# Patient Record
Sex: MALE | Race: WHITE | NOT HISPANIC OR LATINO | URBAN - METROPOLITAN AREA
[De-identification: names, ages, dates, MRNs, and addresses within clinical notes are randomized per-mention and may not be internally consistent; named-entity substitution may affect disease eponyms.]

---

## 2019-01-23 ENCOUNTER — APPOINTMENT (RX ONLY)
Dept: URBAN - METROPOLITAN AREA CLINIC 23 | Facility: CLINIC | Age: 49
Setting detail: DERMATOLOGY
End: 2019-01-23

## 2019-01-23 DIAGNOSIS — L82.0 INFLAMED SEBORRHEIC KERATOSIS: ICD-10-CM

## 2019-01-23 DIAGNOSIS — D22 MELANOCYTIC NEVI: ICD-10-CM

## 2019-01-23 DIAGNOSIS — L73.8 OTHER SPECIFIED FOLLICULAR DISORDERS: ICD-10-CM

## 2019-01-23 DIAGNOSIS — L82.1 OTHER SEBORRHEIC KERATOSIS: ICD-10-CM

## 2019-01-23 DIAGNOSIS — L81.4 OTHER MELANIN HYPERPIGMENTATION: ICD-10-CM

## 2019-01-23 PROBLEM — D22.5 MELANOCYTIC NEVI OF TRUNK: Status: ACTIVE | Noted: 2019-01-23

## 2019-01-23 PROCEDURE — ? COUNSELING

## 2019-01-23 PROCEDURE — 11301 SHAVE SKIN LESION 0.6-1.0 CM: CPT

## 2019-01-23 PROCEDURE — 69100 BIOPSY OF EXTERNAL EAR: CPT

## 2019-01-23 PROCEDURE — 99203 OFFICE O/P NEW LOW 30 MIN: CPT | Mod: 25

## 2019-01-23 PROCEDURE — ? SHAVE REMOVAL

## 2019-01-23 PROCEDURE — ? BIOPSY BY SHAVE METHOD

## 2019-01-23 ASSESSMENT — LOCATION DETAILED DESCRIPTION DERM
LOCATION DETAILED: RIGHT MEDIAL INFERIOR CHEST
LOCATION DETAILED: EPIGASTRIC SKIN
LOCATION DETAILED: LEFT RIB CAGE
LOCATION DETAILED: RIGHT INFERIOR UPPER BACK
LOCATION DETAILED: RIGHT SUPERIOR LATERAL UPPER BACK
LOCATION DETAILED: LEFT SUPERIOR CRUS OF ANTIHELIX
LOCATION DETAILED: LEFT MID-UPPER BACK
LOCATION DETAILED: RIGHT CLAVICULAR SKIN
LOCATION DETAILED: RIGHT MEDIAL MALAR CHEEK

## 2019-01-23 ASSESSMENT — LOCATION ZONE DERM
LOCATION ZONE: TRUNK
LOCATION ZONE: EAR
LOCATION ZONE: FACE

## 2019-01-23 ASSESSMENT — LOCATION SIMPLE DESCRIPTION DERM
LOCATION SIMPLE: CHEST
LOCATION SIMPLE: RIGHT CLAVICULAR SKIN
LOCATION SIMPLE: ABDOMEN
LOCATION SIMPLE: LEFT UPPER BACK
LOCATION SIMPLE: RIGHT UPPER BACK
LOCATION SIMPLE: LEFT EAR
LOCATION SIMPLE: RIGHT CHEEK

## 2019-01-23 NOTE — PROCEDURE: BIOPSY BY SHAVE METHOD
Destruction After The Procedure: No
Electrodesiccation Text: The wound bed was treated with electrodesiccation after the biopsy was performed.
Was A Bandage Applied: Yes
Additional Anesthesia Volume In Cc (Will Not Render If 0): 0
Accession #: [id number]
Silver Nitrate Text: The wound bed was treated with silver nitrate after the biopsy was performed.
Type Of Destruction Used: Curettage
Anesthesia Type: 1% lidocaine with epinephrine
Dressing: bandage
Notification Instructions: Patient will be notified of biopsy results. However, patient instructed to call the office if not contacted within 2 weeks.
Biopsy Type: H and E
Anesthesia Volume In Cc: 0.3
Detail Level: Detailed
Depth Of Biopsy: dermis
Consent: Written consent was obtained and risks were reviewed including but not limited to scarring, infection, bleeding, scabbing, incomplete removal, and allergy to anesthesia.
Cryotherapy Text: The wound bed was treated with cryotherapy after the biopsy was performed.
Biopsy Method: Dermablade
Wound Care: Vaseline
Electrodesiccation And Curettage Text: The wound bed was treated with electrodesiccation and curettage after the biopsy was performed.
Post-care instructions were reviewed in detail and written instructions are provided. Patient is to keep the biopsy site dry overnight, and then apply bacitracin twice daily until healed. Patient may apply hydrogen peroxide soaks to remove any crusting.
Hemostasis: Aluminum Chloride
Billing Type: Third-Party Bill

## 2019-01-23 NOTE — PROCEDURE: SHAVE REMOVAL
Post-Care Instructions: I reviewed with the patient in detail post-care instructions. Patient is to keep the biopsy site dry overnight, and then apply Vaseline and bandaid daily until healed. Patient may apply diluted hydrogen peroxide soaks to remove any crusting.

## 2019-02-27 ENCOUNTER — APPOINTMENT (RX ONLY)
Dept: URBAN - METROPOLITAN AREA CLINIC 23 | Facility: CLINIC | Age: 49
Setting detail: DERMATOLOGY
End: 2019-02-27

## 2019-02-27 DIAGNOSIS — L82.0 INFLAMED SEBORRHEIC KERATOSIS: ICD-10-CM

## 2019-02-27 DIAGNOSIS — D22 MELANOCYTIC NEVI: ICD-10-CM

## 2019-02-27 PROBLEM — D22.5 MELANOCYTIC NEVI OF TRUNK: Status: ACTIVE | Noted: 2019-02-27

## 2019-02-27 PROCEDURE — 11401 EXC TR-EXT B9+MARG 0.6-1 CM: CPT | Mod: 59

## 2019-02-27 PROCEDURE — 17110 DESTRUCTION B9 LES UP TO 14: CPT

## 2019-02-27 PROCEDURE — ? PUNCH EXCISION

## 2019-02-27 PROCEDURE — A4550 SURGICAL TRAYS: HCPCS

## 2019-02-27 PROCEDURE — 12031 INTMD RPR S/A/T/EXT 2.5 CM/<: CPT | Mod: 59

## 2019-02-27 PROCEDURE — ? LIQUID NITROGEN

## 2019-02-27 ASSESSMENT — LOCATION SIMPLE DESCRIPTION DERM
LOCATION SIMPLE: LEFT EAR
LOCATION SIMPLE: ABDOMEN

## 2019-02-27 ASSESSMENT — LOCATION DETAILED DESCRIPTION DERM
LOCATION DETAILED: LEFT SUPERIOR CRUS OF ANTIHELIX
LOCATION DETAILED: LEFT RIB CAGE

## 2019-02-27 ASSESSMENT — LOCATION ZONE DERM
LOCATION ZONE: TRUNK
LOCATION ZONE: EAR

## 2019-02-27 NOTE — PROCEDURE: PUNCH EXCISION
Bill For Surgical Tray: yes
4.5 Mm Punch Excision Text: A 4.5 mm punch biopsy was used to excise the lesion to the level of the subcutaneous fat.  Blunt dissection was used to free the lesion from the surrounding tissues and the lesion was removed.
Anesthesia Volume In Cc: 3
Medical Necessity Clause: This procedure was medically necessary because the lesion that was treated was:
Suture Removal: 14 days
Size Of Margin In Cm: 0.2
8 Mm Punch Excision Text: A 8 mm punch biopsy was used to excise the lesion to the level of the subcutaneous fat.  Blunt dissection was used to free the lesion from the surrounding tissues and the lesion was removed.
12 Mm Punch Excision Text: A 12 mm punch biopsy was used to excise the lesion to the level of the subcutaneous fat.  Blunt dissection was used to free the lesion from the surrounding tissues and the lesion was removed.
Render Post-Care Instructions In Note?: no
Detail Level: Detailed
5 Mm Punch Excision Text: A 5 mm punch biopsy was used to excise the lesion to the level of the subcutaneous fat.  Blunt dissection was used to free the lesion from the surrounding tissues and the lesion was removed.
Post-Care Instructions: I reviewed with the patient in detail post-care instructions. Patient is to keep the biopsy site dry overnight, and then apply bacitracin twice daily until healed. Patient may apply hydrogen peroxide soaks to remove any crusting.
Hemostasis: Pressure
2 Mm Punch Excision Text: A 2 mm punch biopsy was used to excise the lesion to the level of the subcutaneous fat.  Blunt dissection was used to free the lesion from the surrounding tissues and the lesion was removed.
2.5 Mm Punch Excision Text: A 2.5 mm punch biopsy was used to excise the lesion to the level of the subcutaneous fat.  Blunt dissection was used to free the lesion from the surrounding tissues and the lesion was removed.
X Size Of Lesion Width In Cm (Optional): 0
7 Mm Punch Excision Text: A 7 mm punch biopsy was used to excise the lesion to the level of the subcutaneous fat.  Blunt dissection was used to free the lesion from the surrounding tissues and the lesion was removed.
Accession #: [id number]
3.5 Mm Punch Excision Text: A 3.5 mm punch biopsy was used to excise the lesion to the level of the subcutaneous fat.  Blunt dissection was used to free the lesion from the surrounding tissues and the lesion was removed.
Wound Care: Vaseline
Punch Size In Mm: 8
6 Mm Punch Excision Text: A 6 mm punch biopsy was used to excise the lesion to the level of the subcutaneous fat.  Blunt dissection was used to free the lesion from the surrounding tissues and the lesion was removed.
Anesthesia Type: 1% lidocaine with epinephrine
Repair Type: Intermediate
Deep Sutures: 4-0 Vicryl
Notification Instructions: Patient will be notified of biopsy results. However, patient instructed to call the office if not contacted within 2 weeks.
Size Of Lesion (*Required): 0.6
Consent was obtained from the patient. The risks and benefits to therapy were discussed in detail. Specifically, the risks of infection, scarring, bleeding, prolonged wound healing, incomplete removal, allergy to anesthesia, nerve injury and recurrence were addressed. Prior to the procedure, the treatment site was clearly identified and confirmed by the patient.
Wound Dressings: a bandage
Intermediate / Complex Repair - Final Wound Length In Cm: 1.3
10 Mm Punch Excision Text: A 10 mm punch biopsy was used to excise the lesion to the level of the subcutaneous fat.  Blunt dissection was used to free the lesion from the surrounding tissues and the lesion was removed.
3 Mm Punch Excision Text: A 3 mm punch biopsy was used to excise the lesion to the level of the subcutaneous fat.  Blunt dissection was used to free the lesion from the surrounding tissues and the lesion was removed.
1.5 Mm Punch Excision Text: A 1.5 mm punch biopsy was used to excise the lesion to the level of the subcutaneous fat.  Blunt dissection was used to free the lesion from the surrounding tissues and the lesion was removed.
4 Mm Punch Excision Text: A 4 mm punch biopsy was used to excise the lesion to the level of the subcutaneous fat.  Blunt dissection was used to free the lesion from the surrounding tissues and the lesion was removed.
Path Notes (To The Dermatopathologist): Please check margins.
Epidermal Sutures: 4-0 Prolene
Billing Type: Third-Party Bill

## 2019-03-14 ENCOUNTER — APPOINTMENT (RX ONLY)
Dept: URBAN - METROPOLITAN AREA CLINIC 23 | Facility: CLINIC | Age: 49
Setting detail: DERMATOLOGY
End: 2019-03-14

## 2019-03-14 DIAGNOSIS — Z48.02 ENCOUNTER FOR REMOVAL OF SUTURES: ICD-10-CM

## 2019-03-14 PROCEDURE — ? SUTURE REMOVAL (GLOBAL PERIOD)

## 2019-03-14 PROCEDURE — 99024 POSTOP FOLLOW-UP VISIT: CPT

## 2019-03-14 ASSESSMENT — LOCATION DETAILED DESCRIPTION DERM: LOCATION DETAILED: LEFT RIB CAGE

## 2019-03-14 ASSESSMENT — LOCATION ZONE DERM: LOCATION ZONE: TRUNK

## 2019-03-14 ASSESSMENT — LOCATION SIMPLE DESCRIPTION DERM: LOCATION SIMPLE: ABDOMEN

## 2019-03-14 NOTE — PROCEDURE: SUTURE REMOVAL (GLOBAL PERIOD)
Add 11642 Cpt? (Important Note: In 2017 The Use Of 66680 Is Being Tracked By Cms To Determine Future Global Period Reimbursement For Global Periods): no
Detail Level: Detailed

## 2019-08-23 PROBLEM — H26.9 CORTICAL CATARACT OF BOTH EYES: Status: ACTIVE | Noted: 2018-07-03

## 2019-08-23 PROBLEM — H52.4 PRESBYOPIA OF BOTH EYES: Status: ACTIVE | Noted: 2018-07-03

## 2019-09-05 ENCOUNTER — HOSPITAL ENCOUNTER (OUTPATIENT)
Dept: CT IMAGING | Age: 49
Discharge: HOME | End: 2019-09-05
Attending: FAMILY MEDICINE
Payer: COMMERCIAL

## 2019-09-05 DIAGNOSIS — N20.0 KIDNEY STONE: ICD-10-CM

## 2019-09-05 PROCEDURE — 74176 CT ABD & PELVIS W/O CONTRAST: CPT

## 2019-09-12 NOTE — PROGRESS NOTES
The CT scan shows that mass on the kidney that we were already aware of. I hear they have your MRI scheduled for November. You may want to see if they can put you on the cancellation list to get it done sooner.

## 2019-11-13 ENCOUNTER — HOSPITAL ENCOUNTER (OUTPATIENT)
Dept: CT IMAGING | Age: 49
Discharge: HOME | End: 2019-11-13
Attending: UROLOGY
Payer: COMMERCIAL

## 2019-11-13 DIAGNOSIS — N28.89 RENAL MASS: ICD-10-CM

## 2019-11-13 LAB — CREAT BLD-MCNC: 0.9 MG/DL (ref 0.8–1.5)

## 2019-11-13 PROCEDURE — 74011000258 HC RX REV CODE- 258: Performed by: UROLOGY

## 2019-11-13 PROCEDURE — 74160 CT ABDOMEN W/CONTRAST: CPT

## 2019-11-13 PROCEDURE — 82565 ASSAY OF CREATININE: CPT

## 2019-11-13 PROCEDURE — 74011636320 HC RX REV CODE- 636/320: Performed by: UROLOGY

## 2019-11-13 RX ORDER — SODIUM CHLORIDE 0.9 % (FLUSH) 0.9 %
10 SYRINGE (ML) INJECTION
Status: COMPLETED | OUTPATIENT
Start: 2019-11-13 | End: 2019-11-13

## 2019-11-13 RX ADMIN — SODIUM CHLORIDE 100 ML: 900 INJECTION, SOLUTION INTRAVENOUS at 10:58

## 2019-11-13 RX ADMIN — IOPAMIDOL 100 ML: 755 INJECTION, SOLUTION INTRAVENOUS at 10:59

## 2019-11-13 RX ADMIN — Medication 10 ML: at 10:59

## 2019-11-13 NOTE — PROGRESS NOTES
I reviewed with pt. I recommended a R NEDN. All risks, benefits and alternatives to the above mentioned procedure were discussed and the patient is willing to proceed at this time.     R GENE  2h   Need assist  Ancef 2g IV preop  Cbc, cmp, pt/ptt/inr, ua, ucx  inpt  Type and cross 2 units prbc

## 2019-12-04 ENCOUNTER — HOSPITAL ENCOUNTER (OUTPATIENT)
Dept: SURGERY | Age: 49
Discharge: HOME | End: 2019-12-04
Payer: COMMERCIAL

## 2019-12-04 VITALS
WEIGHT: 196.06 LBS | OXYGEN SATURATION: 98 % | HEART RATE: 56 BPM | HEIGHT: 66 IN | TEMPERATURE: 98 F | SYSTOLIC BLOOD PRESSURE: 139 MMHG | DIASTOLIC BLOOD PRESSURE: 81 MMHG | RESPIRATION RATE: 18 BRPM | BODY MASS INDEX: 31.51 KG/M2

## 2019-12-04 LAB
ALBUMIN SERPL-MCNC: 4 G/DL (ref 3.5–5)
ALBUMIN/GLOB SERPL: 1.2 {RATIO} (ref 1.2–3.5)
ALP SERPL-CCNC: 68 U/L (ref 50–136)
ALT SERPL-CCNC: 61 U/L (ref 12–65)
ANION GAP SERPL CALC-SCNC: 5 MMOL/L (ref 7–16)
APPEARANCE UR: NORMAL
APTT PPP: 27.3 SEC (ref 24.7–39.8)
AST SERPL-CCNC: 29 U/L (ref 15–37)
BACTERIA URNS QL MICRO: 0 /HPF
BILIRUB SERPL-MCNC: 1.2 MG/DL (ref 0.2–1.1)
BILIRUB UR QL: NEGATIVE
BUN SERPL-MCNC: 19 MG/DL (ref 6–23)
CALCIUM SERPL-MCNC: 9 MG/DL (ref 8.3–10.4)
CASTS URNS QL MICRO: 0 /LPF
CHLORIDE SERPL-SCNC: 106 MMOL/L (ref 98–107)
CO2 SERPL-SCNC: 30 MMOL/L (ref 21–32)
COLOR UR: YELLOW
CREAT SERPL-MCNC: 1.05 MG/DL (ref 0.8–1.5)
EPI CELLS #/AREA URNS HPF: 0 /HPF
ERYTHROCYTE [DISTWIDTH] IN BLOOD BY AUTOMATED COUNT: 13.1 % (ref 11.9–14.6)
GLOBULIN SER CALC-MCNC: 3.4 G/DL (ref 2.3–3.5)
GLUCOSE SERPL-MCNC: 95 MG/DL (ref 65–100)
GLUCOSE UR STRIP.AUTO-MCNC: NEGATIVE MG/DL
HCT VFR BLD AUTO: 41.8 % (ref 41.1–50.3)
HGB BLD-MCNC: 14.3 G/DL (ref 13.6–17.2)
HGB UR QL STRIP: NEGATIVE
INR PPP: 1
KETONES UR QL STRIP.AUTO: NEGATIVE MG/DL
LEUKOCYTE ESTERASE UR QL STRIP.AUTO: NEGATIVE
MCH RBC QN AUTO: 32.1 PG (ref 26.1–32.9)
MCHC RBC AUTO-ENTMCNC: 34.2 G/DL (ref 31.4–35)
MCV RBC AUTO: 93.9 FL (ref 79.6–97.8)
NITRITE UR QL STRIP.AUTO: NEGATIVE
NRBC # BLD: 0 K/UL (ref 0–0.2)
PH UR STRIP: 6.5 [PH] (ref 5–9)
PLATELET # BLD AUTO: 230 K/UL (ref 150–450)
PMV BLD AUTO: 10 FL (ref 9.4–12.3)
POTASSIUM SERPL-SCNC: 4.1 MMOL/L (ref 3.5–5.1)
PROT SERPL-MCNC: 7.4 G/DL (ref 6.3–8.2)
PROT UR STRIP-MCNC: NEGATIVE MG/DL
PROTHROMBIN TIME: 13.3 SEC (ref 11.7–14.5)
RBC # BLD AUTO: 4.45 M/UL (ref 4.23–5.6)
RBC #/AREA URNS HPF: NORMAL /HPF
SODIUM SERPL-SCNC: 141 MMOL/L (ref 136–145)
SP GR UR REFRACTOMETRY: 1.02 (ref 1–1.02)
UROBILINOGEN UR QL STRIP.AUTO: 0.2 EU/DL (ref 0.2–1)
WBC # BLD AUTO: 4.9 K/UL (ref 4.3–11.1)
WBC URNS QL MICRO: NORMAL /HPF

## 2019-12-04 PROCEDURE — 80053 COMPREHEN METABOLIC PANEL: CPT

## 2019-12-04 PROCEDURE — 85610 PROTHROMBIN TIME: CPT

## 2019-12-04 PROCEDURE — 87086 URINE CULTURE/COLONY COUNT: CPT

## 2019-12-04 PROCEDURE — 81003 URINALYSIS AUTO W/O SCOPE: CPT

## 2019-12-04 PROCEDURE — 85027 COMPLETE CBC AUTOMATED: CPT

## 2019-12-04 PROCEDURE — 85730 THROMBOPLASTIN TIME PARTIAL: CPT

## 2019-12-04 NOTE — PERIOP NOTES
Patient verified name and . Patient provided medical/health information and PTA medications to the best of their ability. TYPE  CASE:3  Order for consent  found in EHR and matches case posting. Labs per surgeon:cbc,bmp,ua,ua cx, Pt,PTT. T/C   Results: pending  Labs per anesthesia protocol: cbc,bmp. Results pending  EKG  :  19  Pt to return to OP lab 12/10/19 for T/C. Patient provided with and instructed on education handouts including Guide to Surgery, blood transfusions, pain management, and hand hygiene for the family and community, and [facility] brochure. Hibiclens and instructions given per hospital policy. Instructed patient to continue previous medications as prescribed prior to surgery unless otherwise directed and to take the following medications the day of surgery according to anesthesia guidelines : none . Instructed patient to hold  the following medications: none. Original medication prescription bottles not visualized during patient appointment. Patient teach back successful and patient demonstrates knowledge of instruction. Explanation of exam/test

## 2019-12-04 NOTE — PERIOP NOTES
Recent Results (from the past 12 hour(s))   URINALYSIS W/ RFLX MICROSCOPIC    Collection Time: 12/04/19 10:35 AM   Result Value Ref Range    Color YELLOW      Appearance TURBID      Specific gravity 1.019 1.001 - 1.023      pH (UA) 6.5 5.0 - 9.0      Protein NEGATIVE  NEG mg/dL    Glucose NEGATIVE  mg/dL    Ketone NEGATIVE  NEG mg/dL    Bilirubin NEGATIVE  NEG      Blood NEGATIVE  NEG      Urobilinogen 0.2 0.2 - 1.0 EU/dL    Nitrites NEGATIVE  NEG      Leukocyte Esterase NEGATIVE  NEG      WBC 0-3 0 /hpf    RBC 3-5 0 /hpf    Epithelial cells 0 0 /hpf    Bacteria 0 0 /hpf    Casts 0 0 /lpf   CBC W/O DIFF    Collection Time: 12/04/19 10:42 AM   Result Value Ref Range    WBC 4.9 4.3 - 11.1 K/uL    RBC 4.45 4.23 - 5.6 M/uL    HGB 14.3 13.6 - 17.2 g/dL    HCT 41.8 41.1 - 50.3 %    MCV 93.9 79.6 - 97.8 FL    MCH 32.1 26.1 - 32.9 PG    MCHC 34.2 31.4 - 35.0 g/dL    RDW 13.1 11.9 - 14.6 %    PLATELET 558 359 - 368 K/uL    MPV 10.0 9.4 - 12.3 FL    ABSOLUTE NRBC 0.00 0.0 - 0.2 K/uL   METABOLIC PANEL, COMPREHENSIVE    Collection Time: 12/04/19 10:42 AM   Result Value Ref Range    Sodium 141 136 - 145 mmol/L    Potassium 4.1 3.5 - 5.1 mmol/L    Chloride 106 98 - 107 mmol/L    CO2 30 21 - 32 mmol/L    Anion gap 5 (L) 7 - 16 mmol/L    Glucose 95 65 - 100 mg/dL    BUN 19 6 - 23 MG/DL    Creatinine 1.05 0.8 - 1.5 MG/DL    GFR est AA >60 >60 ml/min/1.73m2    GFR est non-AA >60 >60 ml/min/1.73m2    Calcium 9.0 8.3 - 10.4 MG/DL    Bilirubin, total 1.2 (H) 0.2 - 1.1 MG/DL    ALT (SGPT) 61 12 - 65 U/L    AST (SGOT) 29 15 - 37 U/L    Alk.  phosphatase 68 50 - 136 U/L    Protein, total 7.4 6.3 - 8.2 g/dL    Albumin 4.0 3.5 - 5.0 g/dL    Globulin 3.4 2.3 - 3.5 g/dL    A-G Ratio 1.2 1.2 - 3.5     PROTHROMBIN TIME + INR    Collection Time: 12/04/19 10:42 AM   Result Value Ref Range    Prothrombin time 13.3 11.7 - 14.5 sec    INR 1.0     PTT    Collection Time: 12/04/19 10:42 AM   Result Value Ref Range    aPTT 27.3 24.7 - 39.8 SEC Reviewed

## 2019-12-04 NOTE — PERIOP NOTES
PLEASE CONTINUE TAKING ALL PRESCRIPTION MEDICATIONS UP TO THE DAY OF SURGERY UNLESS OTHERWISE DIRECTED BELOW. DISCONTINUE all vitamins and supplements 7 days prior to surgery. Home Medications to take  the day of surgery    none           Home Medications   to Hold   Vitamins, Supplements, and Herbals. Non-Steriodal Anti-Inflammatory (NSAIDS) such as Advil and Aleve. Comments                Please do not bring home medications with you on the day of surgery unless otherwise directed by your nurse. If you are instructed to bring home medications, please give them to your nurse as they will be administered by the nursing staff. If you have any questions, please call [address] [place] [address] [phone] or [address] [place] [address] [phone].

## 2019-12-06 LAB
BACTERIA SPEC CULT: NORMAL
SERVICE CMNT-IMP: NORMAL

## 2019-12-10 ENCOUNTER — HOSPITAL ENCOUNTER (OUTPATIENT)
Dept: LAB | Age: 49
Discharge: HOME | DRG: 658 | End: 2019-12-10
Payer: COMMERCIAL

## 2019-12-10 ENCOUNTER — ANESTHESIA EVENT (OUTPATIENT)
Dept: SURGERY | Age: 49
DRG: 658 | End: 2019-12-10
Payer: COMMERCIAL

## 2019-12-10 PROCEDURE — 86923 COMPATIBILITY TEST ELECTRIC: CPT

## 2019-12-10 PROCEDURE — 36415 COLL VENOUS BLD VENIPUNCTURE: CPT

## 2019-12-10 PROCEDURE — 86900 BLOOD TYPING SEROLOGIC ABO: CPT

## 2019-12-11 ENCOUNTER — ANESTHESIA (OUTPATIENT)
Dept: SURGERY | Age: 49
DRG: 658 | End: 2019-12-11
Payer: COMMERCIAL

## 2019-12-11 ENCOUNTER — HOSPITAL ENCOUNTER (INPATIENT)
Age: 49
LOS: 3 days | Discharge: HOME | DRG: 658 | End: 2019-12-14
Attending: UROLOGY | Admitting: UROLOGY
Payer: COMMERCIAL

## 2019-12-11 DIAGNOSIS — N28.89 RENAL MASS: Primary | ICD-10-CM

## 2019-12-11 LAB
HCT VFR BLD AUTO: 40.3 % (ref 41.1–50.3)
HGB BLD-MCNC: 13.4 G/DL (ref 13.6–17.2)

## 2019-12-11 PROCEDURE — 77030009963 HC RELD STPLR ECR J&J -C: Performed by: UROLOGY

## 2019-12-11 PROCEDURE — 77030018673: Performed by: UROLOGY

## 2019-12-11 PROCEDURE — 74011250636 HC RX REV CODE- 250/636: Performed by: NURSE ANESTHETIST, CERTIFIED REGISTERED

## 2019-12-11 PROCEDURE — 77030006984: Performed by: UROLOGY

## 2019-12-11 PROCEDURE — 74011250636 HC RX REV CODE- 250/636: Performed by: ANESTHESIOLOGY

## 2019-12-11 PROCEDURE — 74011250637 HC RX REV CODE- 250/637: Performed by: ANESTHESIOLOGY

## 2019-12-11 PROCEDURE — 77030040830 HC CATH URETH FOL MDII -A: Performed by: UROLOGY

## 2019-12-11 PROCEDURE — 74011250637 HC RX REV CODE- 250/637: Performed by: UROLOGY

## 2019-12-11 PROCEDURE — 77030027138 HC INCENT SPIROMETER -A

## 2019-12-11 PROCEDURE — 77030018836 HC SOL IRR NACL ICUM -A: Performed by: UROLOGY

## 2019-12-11 PROCEDURE — 77030014008 HC SPNG HEMSTAT J&J -C: Performed by: UROLOGY

## 2019-12-11 PROCEDURE — 77030018875 HC APPL CLP LIG4 J&J -B: Performed by: UROLOGY

## 2019-12-11 PROCEDURE — 74011250636 HC RX REV CODE- 250/636: Performed by: UROLOGY

## 2019-12-11 PROCEDURE — 65270000029 HC RM PRIVATE

## 2019-12-11 PROCEDURE — 77030008756 HC TU IRR SUC STRY -B: Performed by: UROLOGY

## 2019-12-11 PROCEDURE — 77030002966 HC SUT PDS J&J -A: Performed by: UROLOGY

## 2019-12-11 PROCEDURE — 77030000038 HC TIP SCIS LAPSCP SURI -B: Performed by: UROLOGY

## 2019-12-11 PROCEDURE — 74011000250 HC RX REV CODE- 250: Performed by: UROLOGY

## 2019-12-11 PROCEDURE — 85018 HEMOGLOBIN: CPT

## 2019-12-11 PROCEDURE — 77030008606 HC TRCR ENDOSC KII AMR -B: Performed by: UROLOGY

## 2019-12-11 PROCEDURE — 77030019908 HC STETH ESOPH SIMS -A: Performed by: ANESTHESIOLOGY

## 2019-12-11 PROCEDURE — 77030027876 HC STPLR ENDOSC FLX PWR J&J -G1: Performed by: UROLOGY

## 2019-12-11 PROCEDURE — 76210000016 HC OR PH I REC 1 TO 1.5 HR: Performed by: UROLOGY

## 2019-12-11 PROCEDURE — 77030039425 HC BLD LARYNG TRULITE DISP TELE -A: Performed by: ANESTHESIOLOGY

## 2019-12-11 PROCEDURE — 77030009527 HC GEL PRT SYS AMR -E: Performed by: UROLOGY

## 2019-12-11 PROCEDURE — 76060000035 HC ANESTHESIA 2 TO 2.5 HR: Performed by: UROLOGY

## 2019-12-11 PROCEDURE — 77030040922 HC BLNKT HYPOTHRM STRY -A: Performed by: ANESTHESIOLOGY

## 2019-12-11 PROCEDURE — 77030034154 HC SHR COAG HARM ACE J&J -F: Performed by: UROLOGY

## 2019-12-11 PROCEDURE — 74011000250 HC RX REV CODE- 250: Performed by: NURSE ANESTHETIST, CERTIFIED REGISTERED

## 2019-12-11 PROCEDURE — 77030018684: Performed by: UROLOGY

## 2019-12-11 PROCEDURE — 77030035042 HC TRCR ENDOSC OPTCL BLDLSS COVD -D: Performed by: UROLOGY

## 2019-12-11 PROCEDURE — 94760 N-INVAS EAR/PLS OXIMETRY 1: CPT

## 2019-12-11 PROCEDURE — 77030007956 HC PCH ENDOSC SPEC COVD -C: Performed by: UROLOGY

## 2019-12-11 PROCEDURE — 76010000171 HC OR TIME 2 TO 2.5 HR INTENSV-TIER 1: Performed by: UROLOGY

## 2019-12-11 PROCEDURE — 77010033678 HC OXYGEN DAILY

## 2019-12-11 PROCEDURE — 77030031139 HC SUT VCRL2 J&J -A: Performed by: UROLOGY

## 2019-12-11 PROCEDURE — 77030008462 HC STPLR SKN PROX J&J -A: Performed by: UROLOGY

## 2019-12-11 PROCEDURE — 77030008522 HC TBNG INSUF LAPRO STRY -B: Performed by: UROLOGY

## 2019-12-11 PROCEDURE — 77030037088 HC TUBE ENDOTRACH ORAL NSL COVD-A: Performed by: ANESTHESIOLOGY

## 2019-12-11 PROCEDURE — 0TT04ZZ RESECTION OF RIGHT KIDNEY, PERCUTANEOUS ENDOSCOPIC APPROACH: ICD-10-PCS | Performed by: UROLOGY

## 2019-12-11 PROCEDURE — 74011000258 HC RX REV CODE- 258: Performed by: UROLOGY

## 2019-12-11 PROCEDURE — 77030020253 HC SOL INJ D545NS .05 DEX .45 SAL

## 2019-12-11 PROCEDURE — 77030008608 HC TRCR ENDOSC SMTH AMR -B: Performed by: UROLOGY

## 2019-12-11 PROCEDURE — 88307 TISSUE EXAM BY PATHOLOGIST: CPT

## 2019-12-11 RX ORDER — FENTANYL CITRATE 50 UG/ML
INJECTION, SOLUTION INTRAMUSCULAR; INTRAVENOUS AS NEEDED
Status: DISCONTINUED | OUTPATIENT
Start: 2019-12-11 | End: 2019-12-11 | Stop reason: HOSPADM

## 2019-12-11 RX ORDER — DOCUSATE SODIUM 100 MG/1
100 CAPSULE, LIQUID FILLED ORAL 2 TIMES DAILY
Status: DISCONTINUED | OUTPATIENT
Start: 2019-12-11 | End: 2019-12-11 | Stop reason: SDUPTHER

## 2019-12-11 RX ORDER — LIDOCAINE HYDROCHLORIDE 20 MG/ML
INJECTION, SOLUTION EPIDURAL; INFILTRATION; INTRACAUDAL; PERINEURAL AS NEEDED
Status: DISCONTINUED | OUTPATIENT
Start: 2019-12-11 | End: 2019-12-11 | Stop reason: HOSPADM

## 2019-12-11 RX ORDER — ROSUVASTATIN CALCIUM 20 MG/1
40 TABLET, COATED ORAL
Status: DISCONTINUED | OUTPATIENT
Start: 2019-12-11 | End: 2019-12-14 | Stop reason: HOSPADM

## 2019-12-11 RX ORDER — DEXAMETHASONE SODIUM PHOSPHATE 4 MG/ML
INJECTION, SOLUTION INTRA-ARTICULAR; INTRALESIONAL; INTRAMUSCULAR; INTRAVENOUS; SOFT TISSUE AS NEEDED
Status: DISCONTINUED | OUTPATIENT
Start: 2019-12-11 | End: 2019-12-11 | Stop reason: HOSPADM

## 2019-12-11 RX ORDER — LISINOPRIL 20 MG/1
20 TABLET ORAL DAILY
Status: DISCONTINUED | OUTPATIENT
Start: 2019-12-12 | End: 2019-12-14 | Stop reason: HOSPADM

## 2019-12-11 RX ORDER — SODIUM CHLORIDE, SODIUM LACTATE, POTASSIUM CHLORIDE, CALCIUM CHLORIDE 600; 310; 30; 20 MG/100ML; MG/100ML; MG/100ML; MG/100ML
INJECTION, SOLUTION INTRAVENOUS
Status: DISCONTINUED | OUTPATIENT
Start: 2019-12-11 | End: 2019-12-11 | Stop reason: HOSPADM

## 2019-12-11 RX ORDER — PROMETHAZINE HYDROCHLORIDE 25 MG/ML
INJECTION, SOLUTION INTRAMUSCULAR; INTRAVENOUS AS NEEDED
Status: DISCONTINUED | OUTPATIENT
Start: 2019-12-11 | End: 2019-12-11 | Stop reason: HOSPADM

## 2019-12-11 RX ORDER — BUPIVACAINE HYDROCHLORIDE 2.5 MG/ML
INJECTION, SOLUTION EPIDURAL; INFILTRATION; INTRACAUDAL AS NEEDED
Status: DISCONTINUED | OUTPATIENT
Start: 2019-12-11 | End: 2019-12-11 | Stop reason: HOSPADM

## 2019-12-11 RX ORDER — EPHEDRINE SULFATE/0.9% NACL/PF 50 MG/5 ML
SYRINGE (ML) INTRAVENOUS AS NEEDED
Status: DISCONTINUED | OUTPATIENT
Start: 2019-12-11 | End: 2019-12-11 | Stop reason: HOSPADM

## 2019-12-11 RX ORDER — ONDANSETRON 2 MG/ML
4 INJECTION INTRAMUSCULAR; INTRAVENOUS
Status: DISCONTINUED | OUTPATIENT
Start: 2019-12-11 | End: 2019-12-14 | Stop reason: HOSPADM

## 2019-12-11 RX ORDER — ZOLPIDEM TARTRATE 5 MG/1
5 TABLET ORAL
Status: DISCONTINUED | OUTPATIENT
Start: 2019-12-11 | End: 2019-12-14 | Stop reason: HOSPADM

## 2019-12-11 RX ORDER — NEOSTIGMINE METHYLSULFATE 1 MG/ML
INJECTION, SOLUTION INTRAVENOUS AS NEEDED
Status: DISCONTINUED | OUTPATIENT
Start: 2019-12-11 | End: 2019-12-11 | Stop reason: HOSPADM

## 2019-12-11 RX ORDER — ACETAMINOPHEN 325 MG/1
650 TABLET ORAL
Status: DISCONTINUED | OUTPATIENT
Start: 2019-12-11 | End: 2019-12-14 | Stop reason: HOSPADM

## 2019-12-11 RX ORDER — SODIUM CHLORIDE 0.9 % (FLUSH) 0.9 %
5-40 SYRINGE (ML) INJECTION EVERY 8 HOURS
Status: DISCONTINUED | OUTPATIENT
Start: 2019-12-11 | End: 2019-12-11 | Stop reason: HOSPADM

## 2019-12-11 RX ORDER — SODIUM CHLORIDE, SODIUM LACTATE, POTASSIUM CHLORIDE, CALCIUM CHLORIDE 600; 310; 30; 20 MG/100ML; MG/100ML; MG/100ML; MG/100ML
75 INJECTION, SOLUTION INTRAVENOUS CONTINUOUS
Status: DISCONTINUED | OUTPATIENT
Start: 2019-12-11 | End: 2019-12-11 | Stop reason: HOSPADM

## 2019-12-11 RX ORDER — LIDOCAINE HYDROCHLORIDE 10 MG/ML
0.1 INJECTION INFILTRATION; PERINEURAL AS NEEDED
Status: DISCONTINUED | OUTPATIENT
Start: 2019-12-11 | End: 2019-12-11 | Stop reason: HOSPADM

## 2019-12-11 RX ORDER — SODIUM CHLORIDE 0.9 % (FLUSH) 0.9 %
5-40 SYRINGE (ML) INJECTION AS NEEDED
Status: DISCONTINUED | OUTPATIENT
Start: 2019-12-11 | End: 2019-12-11 | Stop reason: HOSPADM

## 2019-12-11 RX ORDER — DEXTROSE MONOHYDRATE AND SODIUM CHLORIDE 5; .45 G/100ML; G/100ML
100 INJECTION, SOLUTION INTRAVENOUS CONTINUOUS
Status: DISCONTINUED | OUTPATIENT
Start: 2019-12-11 | End: 2019-12-14

## 2019-12-11 RX ORDER — ACETAMINOPHEN 10 MG/ML
INJECTION, SOLUTION INTRAVENOUS AS NEEDED
Status: DISCONTINUED | OUTPATIENT
Start: 2019-12-11 | End: 2019-12-11 | Stop reason: HOSPADM

## 2019-12-11 RX ORDER — ROCURONIUM BROMIDE 10 MG/ML
INJECTION, SOLUTION INTRAVENOUS AS NEEDED
Status: DISCONTINUED | OUTPATIENT
Start: 2019-12-11 | End: 2019-12-11 | Stop reason: HOSPADM

## 2019-12-11 RX ORDER — DIPHENHYDRAMINE HYDROCHLORIDE 50 MG/ML
12.5 INJECTION, SOLUTION INTRAMUSCULAR; INTRAVENOUS
Status: DISCONTINUED | OUTPATIENT
Start: 2019-12-11 | End: 2019-12-14 | Stop reason: HOSPADM

## 2019-12-11 RX ORDER — NALOXONE HYDROCHLORIDE 0.4 MG/ML
0.2 INJECTION, SOLUTION INTRAMUSCULAR; INTRAVENOUS; SUBCUTANEOUS AS NEEDED
Status: DISCONTINUED | OUTPATIENT
Start: 2019-12-11 | End: 2019-12-11 | Stop reason: HOSPADM

## 2019-12-11 RX ORDER — MORPHINE SULFATE 2 MG/ML
2 INJECTION, SOLUTION INTRAMUSCULAR; INTRAVENOUS
Status: DISCONTINUED | OUTPATIENT
Start: 2019-12-11 | End: 2019-12-14

## 2019-12-11 RX ORDER — OXYCODONE AND ACETAMINOPHEN 5; 325 MG/1; MG/1
1 TABLET ORAL AS NEEDED
Status: DISCONTINUED | OUTPATIENT
Start: 2019-12-11 | End: 2019-12-11 | Stop reason: HOSPADM

## 2019-12-11 RX ORDER — VECURONIUM BROMIDE FOR INJECTION 1 MG/ML
INJECTION, POWDER, LYOPHILIZED, FOR SOLUTION INTRAVENOUS AS NEEDED
Status: DISCONTINUED | OUTPATIENT
Start: 2019-12-11 | End: 2019-12-11 | Stop reason: HOSPADM

## 2019-12-11 RX ORDER — HYDROMORPHONE HYDROCHLORIDE 2 MG/ML
0.5 INJECTION, SOLUTION INTRAMUSCULAR; INTRAVENOUS; SUBCUTANEOUS
Status: DISCONTINUED | OUTPATIENT
Start: 2019-12-11 | End: 2019-12-11 | Stop reason: HOSPADM

## 2019-12-11 RX ORDER — HYDROCODONE BITARTRATE AND ACETAMINOPHEN 7.5; 325 MG/1; MG/1
1 TABLET ORAL
Status: DISCONTINUED | OUTPATIENT
Start: 2019-12-11 | End: 2019-12-12

## 2019-12-11 RX ORDER — CEFAZOLIN SODIUM/WATER 2 G/20 ML
2 SYRINGE (ML) INTRAVENOUS
Status: COMPLETED | OUTPATIENT
Start: 2019-12-11 | End: 2019-12-11

## 2019-12-11 RX ORDER — DOCUSATE SODIUM 100 MG/1
100 CAPSULE, LIQUID FILLED ORAL 2 TIMES DAILY
Status: DISCONTINUED | OUTPATIENT
Start: 2019-12-11 | End: 2019-12-14 | Stop reason: HOSPADM

## 2019-12-11 RX ORDER — ONDANSETRON 2 MG/ML
INJECTION INTRAMUSCULAR; INTRAVENOUS AS NEEDED
Status: DISCONTINUED | OUTPATIENT
Start: 2019-12-11 | End: 2019-12-11 | Stop reason: HOSPADM

## 2019-12-11 RX ORDER — MIDAZOLAM HYDROCHLORIDE 1 MG/ML
2 INJECTION, SOLUTION INTRAMUSCULAR; INTRAVENOUS
Status: COMPLETED | OUTPATIENT
Start: 2019-12-11 | End: 2019-12-11

## 2019-12-11 RX ORDER — GLYCOPYRROLATE 0.2 MG/ML
INJECTION INTRAMUSCULAR; INTRAVENOUS AS NEEDED
Status: DISCONTINUED | OUTPATIENT
Start: 2019-12-11 | End: 2019-12-11 | Stop reason: HOSPADM

## 2019-12-11 RX ORDER — PROPOFOL 10 MG/ML
INJECTION, EMULSION INTRAVENOUS AS NEEDED
Status: DISCONTINUED | OUTPATIENT
Start: 2019-12-11 | End: 2019-12-11 | Stop reason: HOSPADM

## 2019-12-11 RX ADMIN — ROSUVASTATIN CALCIUM 40 MG: 20 TABLET, COATED ORAL at 21:07

## 2019-12-11 RX ADMIN — ACETAMINOPHEN 1000 MG: 10 INJECTION, SOLUTION INTRAVENOUS at 17:58

## 2019-12-11 RX ADMIN — HYDROCODONE BITARTRATE AND ACETAMINOPHEN 1 TABLET: 7.5; 325 TABLET ORAL at 21:53

## 2019-12-11 RX ADMIN — FENTANYL CITRATE 100 MCG: 50 INJECTION INTRAMUSCULAR; INTRAVENOUS at 16:11

## 2019-12-11 RX ADMIN — Medication 4 MG: at 18:07

## 2019-12-11 RX ADMIN — HYDROMORPHONE HYDROCHLORIDE 0.5 MG: 2 INJECTION INTRAMUSCULAR; INTRAVENOUS; SUBCUTANEOUS at 19:02

## 2019-12-11 RX ADMIN — SODIUM CHLORIDE, SODIUM LACTATE, POTASSIUM CHLORIDE, AND CALCIUM CHLORIDE: 600; 310; 30; 20 INJECTION, SOLUTION INTRAVENOUS at 17:59

## 2019-12-11 RX ADMIN — Medication 15 MG: at 16:53

## 2019-12-11 RX ADMIN — HYDROMORPHONE HYDROCHLORIDE 0.5 MG: 2 INJECTION INTRAMUSCULAR; INTRAVENOUS; SUBCUTANEOUS at 18:38

## 2019-12-11 RX ADMIN — GLYCOPYRROLATE 0.6 MG: 0.2 INJECTION, SOLUTION INTRAMUSCULAR; INTRAVENOUS at 18:07

## 2019-12-11 RX ADMIN — Medication 2 G: at 16:11

## 2019-12-11 RX ADMIN — PROMETHAZINE HYDROCHLORIDE 6.25 MG: 25 INJECTION INTRAMUSCULAR; INTRAVENOUS at 18:10

## 2019-12-11 RX ADMIN — SODIUM CHLORIDE, SODIUM LACTATE, POTASSIUM CHLORIDE, AND CALCIUM CHLORIDE: 600; 310; 30; 20 INJECTION, SOLUTION INTRAVENOUS at 16:43

## 2019-12-11 RX ADMIN — MIDAZOLAM 2 MG: 1 INJECTION INTRAMUSCULAR; INTRAVENOUS at 13:48

## 2019-12-11 RX ADMIN — ONDANSETRON 4 MG: 2 INJECTION INTRAMUSCULAR; INTRAVENOUS at 17:00

## 2019-12-11 RX ADMIN — OXYCODONE HYDROCHLORIDE AND ACETAMINOPHEN 1 TABLET: 5; 325 TABLET ORAL at 19:08

## 2019-12-11 RX ADMIN — DEXAMETHASONE SODIUM PHOSPHATE 10 MG: 4 INJECTION, SOLUTION INTRAMUSCULAR; INTRAVENOUS at 17:00

## 2019-12-11 RX ADMIN — FENTANYL CITRATE 50 MCG: 50 INJECTION INTRAMUSCULAR; INTRAVENOUS at 17:40

## 2019-12-11 RX ADMIN — ROCURONIUM BROMIDE 50 MG: 10 INJECTION, SOLUTION INTRAVENOUS at 16:12

## 2019-12-11 RX ADMIN — PROPOFOL 200 MG: 10 INJECTION, EMULSION INTRAVENOUS at 16:12

## 2019-12-11 RX ADMIN — SODIUM CHLORIDE, SODIUM LACTATE, POTASSIUM CHLORIDE, AND CALCIUM CHLORIDE 75 ML/HR: 600; 310; 30; 20 INJECTION, SOLUTION INTRAVENOUS at 13:25

## 2019-12-11 RX ADMIN — LIDOCAINE HYDROCHLORIDE 100 MG: 20 INJECTION, SOLUTION EPIDURAL; INFILTRATION; INTRACAUDAL; PERINEURAL at 16:12

## 2019-12-11 RX ADMIN — MORPHINE SULFATE 2 MG: 2 INJECTION, SOLUTION INTRAMUSCULAR; INTRAVENOUS at 20:55

## 2019-12-11 RX ADMIN — FENTANYL CITRATE 50 MCG: 50 INJECTION INTRAMUSCULAR; INTRAVENOUS at 17:19

## 2019-12-11 RX ADMIN — HYDROMORPHONE HYDROCHLORIDE 0.5 MG: 2 INJECTION INTRAMUSCULAR; INTRAVENOUS; SUBCUTANEOUS at 18:43

## 2019-12-11 RX ADMIN — SODIUM CHLORIDE, SODIUM LACTATE, POTASSIUM CHLORIDE, AND CALCIUM CHLORIDE: 600; 310; 30; 20 INJECTION, SOLUTION INTRAVENOUS at 16:34

## 2019-12-11 RX ADMIN — DOCUSATE SODIUM 100 MG: 100 CAPSULE, LIQUID FILLED ORAL at 21:07

## 2019-12-11 RX ADMIN — DEXTROSE MONOHYDRATE AND SODIUM CHLORIDE 125 ML/HR: 5; .45 INJECTION, SOLUTION INTRAVENOUS at 21:00

## 2019-12-11 RX ADMIN — VECURONIUM BROMIDE 2 MG: 1 INJECTION, POWDER, LYOPHILIZED, FOR SOLUTION INTRAVENOUS at 17:07

## 2019-12-11 RX ADMIN — HYDROMORPHONE HYDROCHLORIDE 0.5 MG: 2 INJECTION INTRAMUSCULAR; INTRAVENOUS; SUBCUTANEOUS at 19:10

## 2019-12-11 RX ADMIN — SODIUM CHLORIDE 1000 MG: 900 INJECTION, SOLUTION INTRAVENOUS at 22:00

## 2019-12-11 NOTE — BRIEF OP NOTE
BRIEF OPERATIVE NOTE    Date of Procedure: 12/11/2019   Preoperative Diagnosis: Renal mass [N28.89]  Postoperative Diagnosis: Renal mass [N28.89]    Procedure(s):  RIGHT RADICAL NEPHRECTOMY LAPAROSCOPIC HAND ASSISTED  Surgeon(s) and Role:     * Peggy Aguirre,  - Primary     * João Schwartz MD - Assisting         Surgical Assistant: Reese Doan    Surgical Staff:  Circ-1: Stanton Beltre RN  Scrub Tech-1: Juan Manuel Perkins  Scrub Tech-2: Alison SNYDER  Event Time In Time Out   Incision Start 1637    Incision Close 1803      Anesthesia: General   Estimated Blood Loss: 25cc  Specimens:   ID Type Source Tests Collected by Time Destination   1 : right kidney Fresh Kid[facility]  Hessie Check, DO 12/11/2019 1749 Pathology      Findings: see op note  Complications: none immediate  Implants: * No implants in log *

## 2019-12-11 NOTE — ANESTHESIA PREPROCEDURE EVALUATION
Relevant Problems   No relevant active problems       Anesthetic History   No history of anesthetic complications            Review of Systems / Medical History  Patient summary reviewed and pertinent labs reviewed    Pulmonary  Within defined limits                 Neuro/Psych   Within defined limits           Cardiovascular    Hypertension (Took his ACE inhibitor today): well controlled          Hyperlipidemia    Exercise tolerance: >4 METS     GI/Hepatic/Renal         Renal disease: stones      Comments: 5x4 cm R renal mass - no significant vascular invasion based on CT Endo/Other        Obesity     Other Findings   Comments: Hb 14.3         Physical Exam    Airway  Mallampati: II  TM Distance: > 6 cm  Neck ROM: normal range of motion   Mouth opening: Normal     Cardiovascular  Regular rate and rhythm,  S1 and S2 normal,  no murmur, click, rub, or gallop             Dental  No notable dental hx       Pulmonary  Breath sounds clear to auscultation               Abdominal         Other Findings            Anesthetic Plan    ASA: 2  Anesthesia type: general  ETT        Induction: Intravenous  Anesthetic plan and risks discussed with: Patient and Spouse      Discussed possibility of a nerve block in setting of conversion to an open procedure - patient amenable and all questions/concerns addressed.

## 2019-12-12 LAB
ANION GAP SERPL CALC-SCNC: 6 MMOL/L (ref 7–16)
BUN SERPL-MCNC: 22 MG/DL (ref 6–23)
CALCIUM SERPL-MCNC: 8.8 MG/DL (ref 8.3–10.4)
CHLORIDE SERPL-SCNC: 104 MMOL/L (ref 98–107)
CO2 SERPL-SCNC: 27 MMOL/L (ref 21–32)
CREAT SERPL-MCNC: 1.87 MG/DL (ref 0.8–1.5)
GLUCOSE SERPL-MCNC: 148 MG/DL (ref 65–100)
HCT VFR BLD AUTO: 41 % (ref 41.1–50.3)
HGB BLD-MCNC: 13.9 G/DL (ref 13.6–17.2)
POTASSIUM SERPL-SCNC: 4.8 MMOL/L (ref 3.5–5.1)
SODIUM SERPL-SCNC: 137 MMOL/L (ref 136–145)

## 2019-12-12 PROCEDURE — 65270000029 HC RM PRIVATE

## 2019-12-12 PROCEDURE — 74011250637 HC RX REV CODE- 250/637: Performed by: UROLOGY

## 2019-12-12 PROCEDURE — 74011000258 HC RX REV CODE- 258: Performed by: UROLOGY

## 2019-12-12 PROCEDURE — 80048 BASIC METABOLIC PNL TOTAL CA: CPT

## 2019-12-12 PROCEDURE — 77030020253 HC SOL INJ D545NS .05 DEX .45 SAL

## 2019-12-12 PROCEDURE — 74011250636 HC RX REV CODE- 250/636: Performed by: UROLOGY

## 2019-12-12 PROCEDURE — 36415 COLL VENOUS BLD VENIPUNCTURE: CPT

## 2019-12-12 PROCEDURE — 85018 HEMOGLOBIN: CPT

## 2019-12-12 RX ORDER — HYDROCODONE BITARTRATE AND ACETAMINOPHEN 10; 325 MG/1; MG/1
1 TABLET ORAL
Status: DISCONTINUED | OUTPATIENT
Start: 2019-12-12 | End: 2019-12-14

## 2019-12-12 RX ADMIN — HYDROCODONE BITARTRATE AND ACETAMINOPHEN 1 TABLET: 7.5; 325 TABLET ORAL at 09:11

## 2019-12-12 RX ADMIN — MORPHINE SULFATE 2 MG: 2 INJECTION, SOLUTION INTRAMUSCULAR; INTRAVENOUS at 12:18

## 2019-12-12 RX ADMIN — HYDROCODONE BITARTRATE AND ACETAMINOPHEN 1 TABLET: 10; 325 TABLET ORAL at 14:23

## 2019-12-12 RX ADMIN — DEXTROSE MONOHYDRATE AND SODIUM CHLORIDE 125 ML/HR: 5; .45 INJECTION, SOLUTION INTRAVENOUS at 15:12

## 2019-12-12 RX ADMIN — SODIUM CHLORIDE 1000 MG: 900 INJECTION, SOLUTION INTRAVENOUS at 05:30

## 2019-12-12 RX ADMIN — HYDROCODONE BITARTRATE AND ACETAMINOPHEN 1 TABLET: 10; 325 TABLET ORAL at 18:50

## 2019-12-12 RX ADMIN — DEXTROSE MONOHYDRATE AND SODIUM CHLORIDE 125 ML/HR: 5; .45 INJECTION, SOLUTION INTRAVENOUS at 05:29

## 2019-12-12 RX ADMIN — DOCUSATE SODIUM 100 MG: 100 CAPSULE, LIQUID FILLED ORAL at 17:18

## 2019-12-12 RX ADMIN — LISINOPRIL 20 MG: 20 TABLET ORAL at 08:09

## 2019-12-12 RX ADMIN — SODIUM CHLORIDE 1000 MG: 900 INJECTION, SOLUTION INTRAVENOUS at 13:43

## 2019-12-12 RX ADMIN — ROSUVASTATIN CALCIUM 40 MG: 20 TABLET, COATED ORAL at 21:17

## 2019-12-12 RX ADMIN — MORPHINE SULFATE 2 MG: 2 INJECTION, SOLUTION INTRAMUSCULAR; INTRAVENOUS at 16:09

## 2019-12-12 RX ADMIN — MORPHINE SULFATE 2 MG: 2 INJECTION, SOLUTION INTRAMUSCULAR; INTRAVENOUS at 17:21

## 2019-12-12 RX ADMIN — DOCUSATE SODIUM 100 MG: 100 CAPSULE, LIQUID FILLED ORAL at 08:09

## 2019-12-12 RX ADMIN — BENZOCAINE AND MENTHOL 1 LOZENGE: 15; 2.6 LOZENGE ORAL at 03:00

## 2019-12-12 RX ADMIN — MORPHINE SULFATE 2 MG: 2 INJECTION, SOLUTION INTRAMUSCULAR; INTRAVENOUS at 02:50

## 2019-12-12 RX ADMIN — DEXTROSE MONOHYDRATE AND SODIUM CHLORIDE 125 ML/HR: 5; .45 INJECTION, SOLUTION INTRAVENOUS at 23:00

## 2019-12-12 NOTE — PROGRESS NOTES
TRANSFER - IN REPORT:    Verbal report received from Sidney forest, RN on Brunilda Gayathri  being received from PACU for routine progression of care      Report consisted of patients Situation, Background, Assessment and   Recommendations(SBAR). Information from the following report(s) SBAR was reviewed with the receiving nurse. Opportunity for questions and clarification was provided. Assessment completed upon patients arrival to unit and care assumed.

## 2019-12-12 NOTE — ANESTHESIA POSTPROCEDURE EVALUATION
Procedure(s):  RIGHT NEPHRECTOMY LAPAROSCOPIC HAND ASSISTED. general    Anesthesia Post Evaluation      Multimodal analgesia: multimodal analgesia used between 6 hours prior to anesthesia start to PACU discharge  Patient location during evaluation: PACU  Patient participation: complete - patient participated  Level of consciousness: awake  Pain management: adequate  Airway patency: patent  Anesthetic complications: no  Cardiovascular status: acceptable  Respiratory status: acceptable  Hydration status: acceptable  Post anesthesia nausea and vomiting:  none      Vitals Value Taken Time   /73 12/11/2019  8:05 PM   Temp 36.8 °C (98.2 °F) 12/11/2019  6:23 PM   Pulse 74 12/11/2019  8:17 PM   Resp 10 12/11/2019  8:17 PM   SpO2 93 % 12/11/2019  8:17 PM   Vitals shown include unvalidated device data.

## 2019-12-12 NOTE — PROGRESS NOTES
Doing well. No complaints other than incisional pain. Denies flatus. AF.  VSS  Abd soft, ND. Dressings c/d/i  Labs reviewed. Cr 1.87  UOP stable and clear  S/P R HALN POD#1  Ambulate. Await flatus to advance diet.   Voiding trial.  Recheck BMP in am.

## 2019-12-12 NOTE — OP NOTES
[facility]  OPERATIVE REPORT    Name:  Nida Appiah  MR#:  [id number]  :  1970  ACCOUNT #:  [de-identified]  DATE OF SERVICE:  2019    PREOPERATIVE DIAGNOSIS:  Right renal mass. POSTOPERATIVE DIAGNOSIS:  Right renal mass. PROCEDURE PERFORMED:  Right hand-assisted laparoscopic radical nephrectomy. SURGEON:  Merlinda Rutter, DO    ASSISTANT:  Yuli Patiño MD    ANESTHESIA:  General.    ESTIMATED BLOOD LOSS:  25 mL. SPECIMENS REMOVED:  Right kidney. COMPLICATIONS:  None immediate. IMPLANTS:  None. HISTORY OF PRESENT ILLNESS:  This is a 50-year-old gentleman who was recently discovered to have an incidental renal mass after presenting with a left ureteral stone. A followup CT shows an invasive 5.6 cm right renal mass without obvious renal vein involvement or lymphadenopathy. All risks, benefits and alternatives to the above-mentioned procedure have been reviewed and he is willing to proceed at this time. DESCRIPTION OF OPERATIVE PROCEDURE:  Patient consent was obtained. The patient was brought back to the operating room at which time he was placed in the supine position. After the uneventful induction of general anesthesia and placement of a Mclain catheter, he was then placed in a modified right lateral decubitus position. All pressure points were carefully padded and the patient was taped to the table. The patient's abdominal area was prepped and draped and a sterile field applied. A small right lower quadrant oblique incision was made. This was carried down to the peritoneal cavity without event. The GelPort was then assembled. The abdomen was insufflated with CO2. Ports were then placed in a standard radical nephrectomy fashion with two 12 mm ports placed in the right upper quadrant. Using the Harmonic scalpel, the white line of Toldt was incised and the colon was reflected medially. The duodenum was identified and kocherized.   The inferior pole of the kidney was dissected out. The ureter was identified and used as a handle to aid in proximal dissection. Using the Harmonic scalpel, the tissue was dissected off the IVC and dissection carried proximally towards the renal hilum. Here, there were several small renal veins and arteries noted. These were all dissected out and transected using the endovascular stapler. The renal veins were transected followed by the renal arteries. The lateral upper pole attachments of the kidney were then taken down sharply using the Harmonic scalpel. The adrenal gland was left within the patient. The ureter was then doubly clipped and divided. Following complete mobilization of the kidney, the kidney was then placed in an EndoCatch bag and removed through the GelPort incision. The intraabdominal pressures were then lowered. No active bleeding was seen. No injury was seen to the colon, small bowel or adjacent liver. The intraabdominal pressures were raised. I did elect to place a piece of Surgicel over the renal hilum. The two 12 mm ports were then closed using 0 Vicryl on the Endo Close device under direct vision. All ports were then removed under direct vision. The GelPort incision was closed in two layers. The first layer incorporated the peritoneum and transversalis fascial layers. This was closed using 0 Vicryl in a running fashion. The second layer incorporated the internal and external oblique fascial layers. This was closed using #1 PDS in a running fashion. All skin incisions were closed using skin staples. The patient tolerated the procedure well. Estimated blood loss was 25 mL.       DO GUSTAVO [id number]  D:  12/11/2019 18:33  T:  12/12/2019 4:52  JOB #:  [id number]

## 2019-12-12 NOTE — PROGRESS NOTES
CM met with pt at bedside to complete assessment. Pt presented alert and oriented. Pt verified address, emergency contact, insurance and PCP. Pt lives with spouse, with 3 steps to enter into the home. Pt is independent with ADL's such as bathing, dressing, cooking, cleaning, and driving. Pt confirmed no DME in the home. Pt has no difficulty obtaining medications in the community. Pt anticipates to return home when medically stable. No needs voiced at this time. CM remains available if any needs shall arise. Care Management Interventions  PCP Verified by CM: Yes  Mode of Transport at Discharge:  Other (see comment)  Transition of Care Consult (CM Consult): Discharge Planning  Discharge Durable Medical Equipment: No(Pt confirmed no DME. )  Physical Therapy Consult: No  Occupational Therapy Consult: No  Speech Therapy Consult: No  Current Support Network: Lives with Spouse, Own Home  Confirm Follow Up Transport: Self  Plan discussed with Pt/Family/Caregiver: Yes  Freedom of Choice Offered: Yes  [place] Resource Information Provided?: No  Discharge Location  Discharge Placement: Home

## 2019-12-12 NOTE — PROGRESS NOTES
12/11/19 2100   Dual Skin Pressure Injury Assessment   Dual Skin Pressure Injury Assessment X   Second Care Provider (Based on [address] [place] [address]) Catie Flores RN   Skin Integumentary   Skin Integumentary (WDL) X   Skin Color Appropriate for ethnicity   Skin Condition/Temp Dry; Warm   Skin Integrity Incision (comment)  (4 trocar sites, ABD, C/D/I)   Turgor Non-tenting   Hair Growth Present   Varicosities Absent     Primary Nurse Keyla Gonzalez RN and Catie Flores RN performed a dual skin assessment on this patient Impairment noted- see wound doc flow sheet. Patient oriented to room and call light. Patient with 4 trocar sites on right abdomen, C/D/I. Patient resting in bed. All needs met at this time. Will continue to monitor. Amado score is 22.

## 2019-12-12 NOTE — PROGRESS NOTES
Hourly rounds completed this shift. Patient resting in bed. Patient walked in hallway this AM. ABD dressing C/D/I. All needs met at this time, will continue to monitor and give report to oncoming RN.

## 2019-12-12 NOTE — PROGRESS NOTES
Pt c/o present pain medication not working well for him. Called MD to make aware, TO received with readback-increase Lizella to  Q 4 hours.

## 2019-12-12 NOTE — PERIOP NOTES
TRANSFER - OUT REPORT:    Verbal report given to [address] on Keri Clark  being transferred to room 623 for routine post op       Report consisted of patients Situation, Background, Assessment and   Recommendations(SBAR). Information from the following report(s) SBAR, Kardex, OR Summary, Intake/Output and MAR was reviewed with the receiving nurse. Lines:   Peripheral IV 12/11/19 Left Wrist (Active)   Site Assessment Clean, dry, & intact 12/11/2019  1:23 PM   Phlebitis Assessment 0 12/11/2019  1:23 PM   Infiltration Assessment 0 12/11/2019  1:23 PM   Dressing Status Clean, dry, & intact 12/11/2019  1:23 PM   Dressing Type Transparent 12/11/2019  1:23 PM   Hub Color/Line Status Green; Infusing 12/11/2019  1:23 PM       Peripheral IV 12/11/19 Right Wrist (Active)        Opportunity for questions and clarification was provided. Patient transported with:   O2 @ 2 liters    VTE prophylaxis orders have been written for Keri Clark. Patient and family given floor number and nurses name.

## 2019-12-13 LAB
ANION GAP SERPL CALC-SCNC: 4 MMOL/L (ref 7–16)
BUN SERPL-MCNC: 17 MG/DL (ref 6–23)
CALCIUM SERPL-MCNC: 9.3 MG/DL (ref 8.3–10.4)
CHLORIDE SERPL-SCNC: 104 MMOL/L (ref 98–107)
CO2 SERPL-SCNC: 32 MMOL/L (ref 21–32)
CREAT SERPL-MCNC: 1.94 MG/DL (ref 0.8–1.5)
GLUCOSE SERPL-MCNC: 119 MG/DL (ref 65–100)
POTASSIUM SERPL-SCNC: 4.4 MMOL/L (ref 3.5–5.1)
SODIUM SERPL-SCNC: 140 MMOL/L (ref 136–145)

## 2019-12-13 PROCEDURE — 74011250637 HC RX REV CODE- 250/637: Performed by: UROLOGY

## 2019-12-13 PROCEDURE — 77030020253 HC SOL INJ D545NS .05 DEX .45 SAL

## 2019-12-13 PROCEDURE — 65270000029 HC RM PRIVATE

## 2019-12-13 PROCEDURE — 36415 COLL VENOUS BLD VENIPUNCTURE: CPT

## 2019-12-13 PROCEDURE — 74011250636 HC RX REV CODE- 250/636: Performed by: UROLOGY

## 2019-12-13 PROCEDURE — 80048 BASIC METABOLIC PNL TOTAL CA: CPT

## 2019-12-13 RX ADMIN — ROSUVASTATIN CALCIUM 40 MG: 20 TABLET, COATED ORAL at 22:32

## 2019-12-13 RX ADMIN — MORPHINE SULFATE 2 MG: 2 INJECTION, SOLUTION INTRAMUSCULAR; INTRAVENOUS at 08:20

## 2019-12-13 RX ADMIN — HYDROCODONE BITARTRATE AND ACETAMINOPHEN 1 TABLET: 10; 325 TABLET ORAL at 04:41

## 2019-12-13 RX ADMIN — HYDROCODONE BITARTRATE AND ACETAMINOPHEN 1 TABLET: 10; 325 TABLET ORAL at 17:09

## 2019-12-13 RX ADMIN — HYDROCODONE BITARTRATE AND ACETAMINOPHEN 1 TABLET: 10; 325 TABLET ORAL at 11:10

## 2019-12-13 RX ADMIN — DOCUSATE SODIUM 100 MG: 100 CAPSULE, LIQUID FILLED ORAL at 08:12

## 2019-12-13 RX ADMIN — Medication 1 SPRAY: at 17:07

## 2019-12-13 RX ADMIN — DOCUSATE SODIUM 100 MG: 100 CAPSULE, LIQUID FILLED ORAL at 17:07

## 2019-12-13 RX ADMIN — LISINOPRIL 20 MG: 20 TABLET ORAL at 08:12

## 2019-12-13 RX ADMIN — MORPHINE SULFATE 2 MG: 2 INJECTION, SOLUTION INTRAMUSCULAR; INTRAVENOUS at 00:02

## 2019-12-13 NOTE — PROGRESS NOTES
Doing well. Pain improved. Denies flatus. AF.  VSS  Abd soft, ND. Ports c/d/i  Cr 1.94  Path pending  S/P R HALN POD#2  Await flatus to advance diet  Ambulate. Home soon.   RTO in 10-14 days for staple removal.

## 2019-12-13 NOTE — PROGRESS NOTES
Pt has requested pain medication this shift. Bowels sounds are hypoactive to distant. No flatus passed. Pt has ambulated halls several times this shift with no difficulty. He is tolerating liquid diet with no complaints.

## 2019-12-13 NOTE — PROGRESS NOTES
Hourly rounds completed this shift. Patient resting in bed at this time. Urine is clear/yellow. Patient voiding without difficulty. Will continue to monitor and give report to oncoming RN.

## 2019-12-14 VITALS
OXYGEN SATURATION: 95 % | HEIGHT: 66 IN | RESPIRATION RATE: 18 BRPM | WEIGHT: 190 LBS | BODY MASS INDEX: 30.53 KG/M2 | HEART RATE: 77 BPM | TEMPERATURE: 97.7 F | SYSTOLIC BLOOD PRESSURE: 132 MMHG | DIASTOLIC BLOOD PRESSURE: 77 MMHG

## 2019-12-14 LAB
ABO + RH BLD: NORMAL
BLD PROD TYP BPU: NORMAL
BLD PROD TYP BPU: NORMAL
BLOOD GROUP ANTIBODIES SERPL: NORMAL
BPU ID: NORMAL
BPU ID: NORMAL
CROSSMATCH RESULT,%XM: NORMAL
CROSSMATCH RESULT,%XM: NORMAL
SPECIMEN EXP DATE BLD: NORMAL
STATUS OF UNIT,%ST: NORMAL
STATUS OF UNIT,%ST: NORMAL
UNIT DIVISION, %UDIV: 0
UNIT DIVISION, %UDIV: 0

## 2019-12-14 PROCEDURE — 74011250637 HC RX REV CODE- 250/637: Performed by: UROLOGY

## 2019-12-14 PROCEDURE — 77030020253 HC SOL INJ D545NS .05 DEX .45 SAL

## 2019-12-14 PROCEDURE — 74011000258 HC RX REV CODE- 258: Performed by: UROLOGY

## 2019-12-14 RX ORDER — HYDROCODONE BITARTRATE AND ACETAMINOPHEN 5; 325 MG/1; MG/1
1 TABLET ORAL
Qty: 12 TAB | Refills: 0 | Status: SHIPPED | OUTPATIENT
Start: 2019-12-14 | End: 2019-12-17

## 2019-12-14 RX ORDER — HYDROCODONE BITARTRATE AND ACETAMINOPHEN 5; 325 MG/1; MG/1
1 TABLET ORAL
Status: DISCONTINUED | OUTPATIENT
Start: 2019-12-14 | End: 2019-12-14 | Stop reason: HOSPADM

## 2019-12-14 RX ORDER — FACIAL-BODY WIPES
10 EACH TOPICAL
Status: COMPLETED | OUTPATIENT
Start: 2019-12-14 | End: 2019-12-14

## 2019-12-14 RX ADMIN — HYDROCODONE BITARTRATE AND ACETAMINOPHEN 1 TABLET: 10; 325 TABLET ORAL at 04:22

## 2019-12-14 RX ADMIN — HYDROCODONE BITARTRATE AND ACETAMINOPHEN 1 TABLET: 5; 325 TABLET ORAL at 13:50

## 2019-12-14 RX ADMIN — DOCUSATE SODIUM 100 MG: 100 CAPSULE, LIQUID FILLED ORAL at 08:36

## 2019-12-14 RX ADMIN — LISINOPRIL 20 MG: 20 TABLET ORAL at 08:36

## 2019-12-14 RX ADMIN — HYDROCODONE BITARTRATE AND ACETAMINOPHEN 1 TABLET: 5; 325 TABLET ORAL at 08:35

## 2019-12-14 RX ADMIN — ACETAMINOPHEN 650 MG: 325 TABLET, FILM COATED ORAL at 06:44

## 2019-12-14 RX ADMIN — BISACODYL 10 MG: 10 SUPPOSITORY RECTAL at 08:36

## 2019-12-14 RX ADMIN — DEXTROSE MONOHYDRATE AND SODIUM CHLORIDE 100 ML/HR: 5; .45 INJECTION, SOLUTION INTRAVENOUS at 04:27

## 2019-12-14 NOTE — PROGRESS NOTES
Received order to discharge patient. IV's x 2 discontinued, pressure dressings applied/secured with tape. Discharge/medication instructions reviewed with patient, verbalized understanding. Discharge/medication paperwork given to patient to take home. Escorted to discharge area via wheelchair.

## 2019-12-14 NOTE — PROGRESS NOTES
Admit Date: 12/11/2019    Subjective:     Patient has no new complaint. He has passed flatus. He wants to be discharged today. Objective:     Patient Vitals for the past 8 hrs:   BP Temp Pulse Resp SpO2   12/14/19 0733 146/88 97.6 °F (36.4 °C) 71 17 94 %   12/14/19 0430 143/84 98 °F (36.7 °C) 76 18 92 %   12/13/19 2342 129/74 98.1 °F (36.7 °C) 81 16 90 %     No intake/output data recorded. 12/12 1901 - 12/14 0700  In: 720 [P.O.:720]  Out: 1100 [Urine:1100]    Physical Exam: rrr, ctab, abd with +BS, incisions c/d/i        Data Review No results found for this or any previous visit (from the past 24 hour(s)). Assessment:     Active Problems:    Renal mass (12/11/2019)    POD 3 s/p RHALN    Plan:     Dulcolax suppository this AM.    Regular diet. Ambulate. Home later this afternoon.     Mamta Jackson MD

## 2019-12-14 NOTE — PROGRESS NOTES
Hourly rounds and needs met. States he did pass some flatus this shift. PRN pain meds helpful.  Will continue to monitor

## 2019-12-14 NOTE — DISCHARGE INSTRUCTIONS
DISCHARGE SUMMARY from Nurse    PATIENT INSTRUCTIONS:    After general anesthesia or intravenous sedation, for 24 hours or while taking prescription Narcotics:  · Limit your activities  · Do not drive and operate hazardous machinery  · Do not make important personal or business decisions  · Do  not drink alcoholic beverages  · If you have not urinated within 8 hours after discharge, please contact your surgeon on call. Report the following to your surgeon:  · Excessive pain, swelling, redness or odor of or around the surgical area  · Temperature over 100.5  · Nausea and vomiting lasting longer than 4 hours or if unable to take medications  · Any signs of decreased circulation or nerve impairment to extremity: change in color, persistent  numbness, tingling, coldness or increase pain  · Any questions    What to do at Home:  Recommended activity: No lifting, Driving, or Strenuous exercise for 2 weeks or until follow up appointment. If you experience any of the following symptoms fever 101.5, nausea/vomiting, chills, purulent drainage at incision site, weakness, or shortness of breath, please follow up with Md.    *  Please give a list of your current medications to your Primary Care Provider. *  Please update this list whenever your medications are discontinued, doses are      changed, or new medications (including over-the-counter products) are added. *  Please carry medication information at all times in case of emergency situations. These are general instructions for a healthy lifestyle:    No smoking/ No tobacco products/ Avoid exposure to second hand smoke  Surgeon General's Warning:  Quitting smoking now greatly reduces serious risk to your health.     Obesity, smoking, and sedentary lifestyle greatly increases your risk for illness    A healthy diet, regular physical exercise & weight monitoring are important for maintaining a healthy lifestyle    You may be retaining fluid if you have a history of heart failure or if you experience any of the following symptoms:  Weight gain of 3 pounds or more overnight or 5 pounds in a week, increased swelling in our hands or feet or shortness of breath while lying flat in bed. Please call your doctor as soon as you notice any of these symptoms; do not wait until your next office visit. The discharge information has been reviewed with the patient and spouse. The patient and spouse verbalized understanding. Discharge medications reviewed with the patient and spouse and appropriate educational materials and side effects teaching were provided.   ___________________________________________________________________________________________________________________________________

## 2019-12-14 NOTE — PROGRESS NOTES
Pt is for discharge home today with no needs/supportive care orders recieved for CM at this time. Care Management Interventions  PCP Verified by CM: Yes  Mode of Transport at Discharge:  Other (see comment)  Transition of Care Consult (CM Consult): Discharge Planning  Discharge Durable Medical Equipment: No(Pt confirmed no DME. )  Physical Therapy Consult: No  Occupational Therapy Consult: No  Speech Therapy Consult: No  Current Support Network: Lives with Spouse, Own Home  Confirm Follow Up Transport: Self  Plan discussed with Pt/Family/Caregiver: Yes  Freedom of Choice Offered: Yes   Resource Information Provided?: No  Discharge Location  Discharge Placement: Home

## 2019-12-27 NOTE — DISCHARGE SUMMARY
[facility]    Name:  Shamar Ulloa  MR#:  [id number]  :  1970  ACCOUNT #:  [de-identified]  ADMIT DATE:  2019  DISCHARGE DATE:  2019      DISCHARGE DIAGNOSES:  1. Right renal mass. 2.  Hypertension. 3.  Hyperlipidemia. PROCEDURE:  Right hand-assisted laparoscopic radical nephrectomy on 2019. CLINICAL HISTORY:  This is a 50-year-old gentleman who was recently discovered to have an incidental right renal mass after presenting with a ureteral stone. CT renal mass protocol performed in followup on 2019 showed a 5.6-cm invasive right renal mass. All risks, benefits, and alternatives to the above-mentioned procedure have been reviewed and he is willing to proceed at this time. DESCRIPTION OF HOSPITAL COURSE:  The patient underwent an uneventful right laparoscopic nephrectomy on 2019. Postoperatively, he was admitted to the floor and he began ambulating on postoperative day #1. He was immediately initiated on a clear liquid diet. His Mclain catheter was removed on postoperative day #1. His creatinine did peak at 1.94 on postoperative day #2. He began passing flatus on postoperative day #2 and his diet was advanced. By postoperative day #3, he was voiding well and tolerating a regular diet. Decision was made to discharge the patient home. He will follow up with me in 10-14 days for a postoperative check and staple removal.    DISCHARGE MEDICATIONS:  He was instructed to resume all prior home medications. New medications were given by Dr. Artelia Bamberger. However, this information is not available at the time of this dictation. DISCHARGE INSTRUCTIONS:  If he has any problems or concerns at home, he was instructed to call us immediately.       DO GUSTAVO [email or web address]  D:  2019 8:14  T:  2019 5:52  JOB #:  [id number]

## 2020-06-22 ENCOUNTER — HOSPITAL ENCOUNTER (OUTPATIENT)
Dept: ULTRASOUND IMAGING | Age: 50
Discharge: HOME | End: 2020-06-22
Attending: UROLOGY
Payer: COMMERCIAL

## 2020-06-22 DIAGNOSIS — C64.1 RENAL CANCER, RIGHT (HCC): ICD-10-CM

## 2020-06-22 PROCEDURE — 76770 US EXAM ABDO BACK WALL COMP: CPT

## 2020-09-14 PROBLEM — H52.03 LATENT HYPEROPIA OF BOTH EYES: Status: ACTIVE | Noted: 2020-06-22

## 2021-01-06 ENCOUNTER — HOSPITAL ENCOUNTER (OUTPATIENT)
Dept: CT IMAGING | Age: 51
Discharge: HOME | End: 2021-01-06
Attending: UROLOGY
Payer: COMMERCIAL

## 2021-01-06 DIAGNOSIS — C64.1 RENAL CANCER, RIGHT (HCC): ICD-10-CM

## 2021-01-06 PROCEDURE — 74176 CT ABD & PELVIS W/O CONTRAST: CPT

## 2021-10-02 PROBLEM — N18.31 STAGE 3A CHRONIC KIDNEY DISEASE (HCC): Status: ACTIVE | Noted: 2021-10-02

## 2021-10-02 PROBLEM — Z90.5 H/O RIGHT NEPHRECTOMY: Status: ACTIVE | Noted: 2021-10-02

## 2021-10-02 PROBLEM — C64.1 RENAL CANCER, RIGHT (HCC): Status: ACTIVE | Noted: 2021-10-02

## 2022-03-18 PROBLEM — H52.03 LATENT HYPEROPIA OF BOTH EYES: Status: ACTIVE | Noted: 2020-06-22

## 2022-03-18 PROBLEM — Z90.5 H/O RIGHT NEPHRECTOMY: Status: ACTIVE | Noted: 2021-10-02

## 2022-03-18 PROBLEM — N28.89 RENAL MASS: Status: ACTIVE | Noted: 2019-12-11

## 2022-03-19 PROBLEM — H52.4 PRESBYOPIA OF BOTH EYES: Status: ACTIVE | Noted: 2018-07-03

## 2022-03-19 PROBLEM — H26.9 CORTICAL CATARACT OF BOTH EYES: Status: ACTIVE | Noted: 2018-07-03

## 2022-03-19 PROBLEM — C64.1 RENAL CANCER, RIGHT (HCC): Status: ACTIVE | Noted: 2021-10-02

## 2022-03-20 PROBLEM — N18.31 STAGE 3A CHRONIC KIDNEY DISEASE (HCC): Status: ACTIVE | Noted: 2021-10-02

## 2022-05-26 ENCOUNTER — TELEPHONE (OUTPATIENT)
Dept: UROLOGY | Age: 52
End: 2022-05-26

## 2022-05-26 RX ORDER — RAMIPRIL 5 MG/1
CAPSULE ORAL
Qty: 180 CAPSULE | OUTPATIENT
Start: 2022-05-26

## 2022-05-26 NOTE — TELEPHONE ENCOUNTER
Pt called and states that he has a hx of nephrectomy. The pt states that he is now having some pain along the incision site. Looking back in the old system it states he was to see Dr. Gia Varghese. Does pt need to see you and any scans prior? Does pt need to be referred to Dr. Gia Varghese? ?dh

## 2022-06-01 ENCOUNTER — TELEPHONE (OUTPATIENT)
Dept: FAMILY MEDICINE CLINIC | Facility: CLINIC | Age: 52
End: 2022-06-01

## 2022-06-06 ENCOUNTER — OFFICE VISIT (OUTPATIENT)
Dept: FAMILY MEDICINE CLINIC | Facility: CLINIC | Age: 52
End: 2022-06-06
Payer: COMMERCIAL

## 2022-06-06 VITALS
BODY MASS INDEX: 32.4 KG/M2 | SYSTOLIC BLOOD PRESSURE: 137 MMHG | WEIGHT: 201.6 LBS | RESPIRATION RATE: 16 BRPM | OXYGEN SATURATION: 98 % | HEART RATE: 58 BPM | TEMPERATURE: 98.4 F | DIASTOLIC BLOOD PRESSURE: 91 MMHG | HEIGHT: 66 IN

## 2022-06-06 DIAGNOSIS — N18.31 STAGE 3A CHRONIC KIDNEY DISEASE (HCC): ICD-10-CM

## 2022-06-06 DIAGNOSIS — E78.2 MIXED HYPERLIPIDEMIA: ICD-10-CM

## 2022-06-06 DIAGNOSIS — R09.81 NASAL CONGESTION: ICD-10-CM

## 2022-06-06 DIAGNOSIS — I10 PRIMARY HYPERTENSION: Primary | ICD-10-CM

## 2022-06-06 DIAGNOSIS — R73.09 ABNORMAL GLUCOSE: ICD-10-CM

## 2022-06-06 DIAGNOSIS — Z79.899 ENCOUNTER FOR LONG-TERM (CURRENT) USE OF MEDICATIONS: ICD-10-CM

## 2022-06-06 DIAGNOSIS — J34.2 DEVIATED SEPTUM: ICD-10-CM

## 2022-06-06 PROCEDURE — 99214 OFFICE O/P EST MOD 30 MIN: CPT | Performed by: FAMILY MEDICINE

## 2022-06-06 RX ORDER — ROSUVASTATIN CALCIUM 40 MG/1
40 TABLET, COATED ORAL EVERY EVENING
Qty: 180 TABLET | Refills: 1 | Status: SHIPPED | OUTPATIENT
Start: 2022-06-06

## 2022-06-06 RX ORDER — RAMIPRIL 5 MG/1
5 CAPSULE ORAL DAILY
COMMUNITY
End: 2022-06-06 | Stop reason: SDUPTHER

## 2022-06-06 RX ORDER — ROSUVASTATIN CALCIUM 40 MG/1
40 TABLET, COATED ORAL EVERY EVENING
COMMUNITY
End: 2022-06-06 | Stop reason: SDUPTHER

## 2022-06-06 RX ORDER — RAMIPRIL 5 MG/1
5 CAPSULE ORAL 2 TIMES DAILY
Qty: 180 CAPSULE | Refills: 1 | Status: SHIPPED | OUTPATIENT
Start: 2022-06-06

## 2022-06-06 ASSESSMENT — ENCOUNTER SYMPTOMS
DIARRHEA: 0
ABDOMINAL PAIN: 0
COLOR CHANGE: 0
SINUS PRESSURE: 0
SINUS PAIN: 0
CONSTIPATION: 0
VOMITING: 0
BACK PAIN: 0
NAUSEA: 0
SHORTNESS OF BREATH: 0

## 2022-06-06 ASSESSMENT — PATIENT HEALTH QUESTIONNAIRE - PHQ9
SUM OF ALL RESPONSES TO PHQ QUESTIONS 1-9: 0
SUM OF ALL RESPONSES TO PHQ QUESTIONS 1-9: 0
2. FEELING DOWN, DEPRESSED OR HOPELESS: 0
1. LITTLE INTEREST OR PLEASURE IN DOING THINGS: 0
SUM OF ALL RESPONSES TO PHQ9 QUESTIONS 1 & 2: 0
SUM OF ALL RESPONSES TO PHQ QUESTIONS 1-9: 0
SUM OF ALL RESPONSES TO PHQ QUESTIONS 1-9: 0

## 2022-06-06 NOTE — PROGRESS NOTES
HISTORY OF PRESENT ILLNESS  Chief Complaint   Patient presents with    Referral - General     ENT; continous sinus issues;        ENT; continous sinus issues; Right nostril gotten smaller. Seems to be snoring more. Normally waking up 2-3 times a night  Has to sleep with hand under chin so it improves airflow    spot on leg STILL not healed!! Everything popping and hurting and getting older. Pain on the right side of stomach - has an appt in 6/16/22. I termittent - from working out. Previously said, \"  place on leg that wont heal   Has had it for 2 years - goes over it and then breaks open  Joint pain knees r>L when he runs. Elbows hurting for awhile from pushups and pull ups. Sore to touch on the medial elbows. Tingling in the fingers when he drives. history of carpal tunnel. \"    HPI   Wound Check   Pertinent negatives include no chest pain, no abdominal pain, no headaches and no  shortness of breath. Skin Problem   The history is provided by the patient. This is a chronic problem. The current episode started more than 1 week ago. The  problem occurs daily. The problem has not changed since  onset. Pertinent negatives include no chest pain, no  abdominal pain, no headaches and no shortness of breath. Nothing aggravates the symptoms. Nothing relieves the symptoms. He has tried nothing for the symptoms. The treatment provided no relief. Hypertension    Pertinent negatives include no chest pain, no palpitations, no malaise/fatigue, no blurred vision, no headaches, no dizziness, no shortness of breath, no nausea and no vomiting. Cholesterol Problem   The history is provided by the patient. This is a chronic problem. The current episode started more than 1 week ago. The  problem occurs daily. The problem has not changed since  onset. Pertinent negatives include no chest pain, no  abdominal pain, no headaches and no shortness of breath. The symptoms are aggravated by eating.  The symptoms are relieved by medications. The  treatment provided significant relief. Review of Systems   Constitutional: Negative for chills, fatigue and fever. HENT: Negative for congestion, sinus pressure and sinus pain. Respiratory: Negative for shortness of breath. Cardiovascular: Negative for chest pain and palpitations. Gastrointestinal: Negative for abdominal pain, constipation, diarrhea, nausea and vomiting. Genitourinary: Negative for difficulty urinating, dysuria, frequency and urgency. Musculoskeletal: Negative for arthralgias, back pain and neck pain. Skin: Negative for color change and pallor. Allergic/Immunologic: Negative for environmental allergies. Neurological: Negative for headaches. Psychiatric/Behavioral: The patient is not nervous/anxious. Past Medical History:   Diagnosis Date    HTN (hypertension) 2012    Hyperlipidemia 2012    Kidney stones      Past Surgical History:   Procedure Laterality Date    NEPHROSTOMY  2019    VASECTOMY       Family History   Problem Relation Age of Onset    Heart Disease Brother         Stent in LAD    Lung Disease Father         lung disease    High Cholesterol Mother     Heart Disease Mother     Liver Disease Mother     Diabetes Mother     Hypertension Mother      Family Status   Relation Name Status    Brother  (Not Specified)    Father  Alive    Mother   at age 79        liver failure ( due to meds son thinks)      Social History     Socioeconomic History    Marital status:      Spouse name: Not on file    Number of children: Not on file    Years of education: Not on file    Highest education level: Not on file   Occupational History    Not on file   Tobacco Use    Smoking status: Never Smoker    Smokeless tobacco: Never Used   Vaping Use    Vaping Use: Never used   Substance and Sexual Activity    Alcohol use:  Yes    Drug use: No    Sexual activity: Not on file   Other Topics Concern    Not on file   Social History Narrative    Not on file     Social Determinants of Health     Financial Resource Strain:     Difficulty of Paying Living Expenses: Not on file   Food Insecurity:     Worried About Running Out of Food in the Last Year: Not on file    Rupert of Food in the Last Year: Not on file   Transportation Needs:     Lack of Transportation (Medical): Not on file    Lack of Transportation (Non-Medical): Not on file   Physical Activity:     Days of Exercise per Week: Not on file    Minutes of Exercise per Session: Not on file   Stress:     Feeling of Stress : Not on file   Social Connections:     Frequency of Communication with Friends and Family: Not on file    Frequency of Social Gatherings with Friends and Family: Not on file    Attends Protestant Services: Not on file    Active Member of [address] [place] [address] or Organizations: Not on file    Attends Club or Organization Meetings: Not on file    Marital Status: Not on file   Intimate Partner Violence:     Fear of Current or Ex-Partner: Not on file    Emotionally Abused: Not on file    Physically Abused: Not on file    Sexually Abused: Not on file   Housing Stability:     Unable to Pay for Housing in the Last Year: Not on file    Number of [place] in the Last Year: Not on file    Unstable Housing in the Last Year: Not on file       No Known Allergies  Current Outpatient Medications   Medication Sig    ramipril (ALTACE) 5 MG capsule Take 1 capsule by mouth in the morning and at bedtime    rosuvastatin (CRESTOR) 40 MG tablet Take 1 tablet by mouth every evening     No current facility-administered medications for this visit.       Patient Active Problem List   Diagnosis    Latent hyperopia of both eyes    Renal mass    H/O right nephrectomy    Hyperlipidemia    HTN (hypertension)    Renal cancer, right ([address]    Cortical cataract of both eyes    Presbyopia of both eyes    Stage 3a chronic kidney disease ([address]    Second hand tobacco smoke exposure        Blood pressure (!) 137/91, pulse 58, temperature 98.4 °F (36.9 °C), temperature source Temporal, resp. rate 16, height 5' 6\" (1.676 m), weight 201 lb 9.6 oz (91.4 kg), SpO2 98 %. Pain Scale: 0 - No pain/10    0 - No pain    Body mass index is 32.54 kg/m². Physical Exam  Vitals and nursing note reviewed. Constitutional:       General: He is not in acute distress. Appearance: He is normal weight. He is not toxic-appearing. HENT:      Head: Normocephalic. Eyes:      General: Lids are normal.      Extraocular Movements: Extraocular movements intact. Conjunctiva/sclera: Conjunctivae normal.      Pupils: Pupils are equal.   Cardiovascular:      Heart sounds: Normal heart sounds. No murmur heard. No friction rub. No gallop. Pulmonary:      Effort: Pulmonary effort is normal. No respiratory distress. Breath sounds: Normal breath sounds. No stridor. No wheezing, rhonchi or rales. Chest:      Chest wall: No tenderness. Skin:     General: Skin is warm and dry. Capillary Refill: Capillary refill takes less than 2 seconds. Comments: Mostly healed right shin lesion. Neurological:      General: No focal deficit present. Mental Status: He is alert and oriented to person, place, and time. Psychiatric:         Mood and Affect: Mood normal.         Behavior: Behavior normal.         Thought Content: Thought content normal.         Judgment: Judgment normal.              ASSESSMENT and PLAN   Diagnosis Orders   1. Primary hypertension  ramipril (ALTACE) 5 MG capsule    Comprehensive Metabolic Panel   2. Mixed hyperlipidemia  rosuvastatin (CRESTOR) 40 MG tablet    Lipid Panel   3. Nasal congestion  Jhonny Ruiz MD, Otolaryngology, [place]   4. [facility] - Sophia Zavala MD, Otolaryngology, [place]   5. Stage 3a chronic kidney disease (HCC)  Comprehensive Metabolic Panel   6. Abnormal glucose  Hemoglobin A1C   7. Encounter for long-term (current) use of medications  CBC with Auto Differential       Reviewed medications and side effects in detail    Add compression daily. The patient's condition was discussed at length with the patient. The expected course and possible complications were reviewed. All questions were answered. His other chronic conditions are stable at this time. He is stable on the current treatment and tolerating it well. No significant sides effects. Will not adjust therapy at this time, unless noted above. We will continue to monitor for any problems. Medications refilled and lab work has been ordered where needed. Reviewed medications are explained including any potential interactions or side effects in detail. The patient's questions were answered. He  understands the above and has no further questions. Further workup and treatment should be done if symptoms persist, worsen or new symptoms occur. He  will call to notify us of any problems, complications or worsening symptoms. Follow-up and Dispositions    · Return in about 6 months (around 12/6/2022) for labs today. There are no Patient Instructions on file for this visit. (Some details in this note may have been created with speech-recognition software. Some errors in speech recognition may have occurred.    Most of those have been corrected but some may have been missed.)         Praveen Solano MD  [address] [facility]  Phone [phone]

## 2022-06-07 LAB
BASOPHILS # BLD: 0 K/UL (ref 0–0.2)
BASOPHILS NFR BLD: 1 % (ref 0–2)
CHOLEST SERPL-MCNC: 186 MG/DL
DIFFERENTIAL METHOD BLD: NORMAL
EOSINOPHIL # BLD: 0.1 K/UL (ref 0–0.8)
EOSINOPHIL NFR BLD: 1 % (ref 0.5–7.8)
ERYTHROCYTE [DISTWIDTH] IN BLOOD BY AUTOMATED COUNT: 13.3 % (ref 11.9–14.6)
EST. AVERAGE GLUCOSE BLD GHB EST-MCNC: 117 MG/DL
HBA1C MFR BLD: 5.7 % (ref 4.2–6.3)
HCT VFR BLD AUTO: 44.6 % (ref 41.1–50.3)
HDLC SERPL-MCNC: 42 MG/DL (ref 40–60)
HDLC SERPL: 4.4 {RATIO}
HGB BLD-MCNC: 15 G/DL (ref 13.6–17.2)
IMM GRANULOCYTES # BLD AUTO: 0 K/UL (ref 0–0.5)
IMM GRANULOCYTES NFR BLD AUTO: 0 % (ref 0–5)
LDLC SERPL CALC-MCNC: 111.2 MG/DL
LYMPHOCYTES # BLD: 1.6 K/UL (ref 0.5–4.6)
LYMPHOCYTES NFR BLD: 28 % (ref 13–44)
MCH RBC QN AUTO: 31.7 PG (ref 26.1–32.9)
MCHC RBC AUTO-ENTMCNC: 33.6 G/DL (ref 31.4–35)
MCV RBC AUTO: 94.3 FL (ref 79.6–97.8)
MONOCYTES # BLD: 0.5 K/UL (ref 0.1–1.3)
MONOCYTES NFR BLD: 9 % (ref 4–12)
NEUTS SEG # BLD: 3.6 K/UL (ref 1.7–8.2)
NEUTS SEG NFR BLD: 61 % (ref 43–78)
NRBC # BLD: 0 K/UL (ref 0–0.2)
PLATELET # BLD AUTO: 235 K/UL (ref 150–450)
PMV BLD AUTO: 10.3 FL (ref 9.4–12.3)
RBC # BLD AUTO: 4.73 M/UL (ref 4.23–5.6)
TRIGL SERPL-MCNC: 164 MG/DL (ref 35–150)
VLDLC SERPL CALC-MCNC: 32.8 MG/DL (ref 6–23)
WBC # BLD AUTO: 5.8 K/UL (ref 4.3–11.1)

## 2022-06-07 NOTE — RESULT ENCOUNTER NOTE
Your blood sugar was slightly elevated but your A1c is stable at 5.7. Your kidney function is stable. Your triglycerides are elevated. Triglycerides are the sugar part of your cholesterol. Make sure you are eating a heart healthy nutritious diet, getting regular aerobic physical activity, maintaining desired appropriate body weight, and avoiding tobacco products. Recheck lab work with an appointment in 6 months.   Other labs are normal.

## 2022-06-16 ENCOUNTER — OFFICE VISIT (OUTPATIENT)
Dept: UROLOGY | Age: 52
End: 2022-06-16
Payer: COMMERCIAL

## 2022-06-16 DIAGNOSIS — C64.1 RENAL CANCER, RIGHT (HCC): Primary | ICD-10-CM

## 2022-06-16 DIAGNOSIS — R10.9 RIGHT SIDED ABDOMINAL PAIN: ICD-10-CM

## 2022-06-16 LAB
BILIRUBIN, URINE, POC: NEGATIVE
BLOOD URINE, POC: NORMAL
GLUCOSE URINE, POC: NEGATIVE
KETONES, URINE, POC: NEGATIVE
LEUKOCYTE ESTERASE, URINE, POC: NEGATIVE
NITRITE, URINE, POC: NEGATIVE
PH, URINE, POC: 5.5 (ref 4.6–8)
PROTEIN,URINE, POC: NEGATIVE
SPECIFIC GRAVITY, URINE, POC: 1.03 (ref 1–1.03)
URINALYSIS CLARITY, POC: NORMAL
URINALYSIS COLOR, POC: NORMAL
UROBILINOGEN, POC: NORMAL

## 2022-06-16 PROCEDURE — 81003 URINALYSIS AUTO W/O SCOPE: CPT | Performed by: NURSE PRACTITIONER

## 2022-06-16 PROCEDURE — 99214 OFFICE O/P EST MOD 30 MIN: CPT | Performed by: NURSE PRACTITIONER

## 2022-06-16 ASSESSMENT — ENCOUNTER SYMPTOMS
ABDOMINAL PAIN: 1
BACK PAIN: 0

## 2022-06-16 NOTE — PROGRESS NOTES
[facility]  [address] [place] [address] [address]  [place], [address]   [address]  : 1970    Chief Complaint   Patient presents with    Other     Pain at incision site-R nephrectomy apprx 3 years ago          JAILYN Dawson is a 47 y.o. male returns in follow up for  [address].  S/P R HALN on 19.  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  Last Cr was 1.49 on 19.  No new complaints.  He elected not to visit with Dr. Edwin Anna. UMER on 20 was neg. CT a/p wo contrast  showed no recurrence. Has not been seen here since . Today he reports for R sided abd pain. This has been present since his original surgery in 2019. It was severe a few weeks ago but has since become manageable. No fever/chills, gross hematuria, or LUTS. sCr 1.50. this is baseline since 2019. Past Medical History:   Diagnosis Date    HTN (hypertension) 2012    Hyperlipidemia 2012    Kidney stones      Past Surgical History:   Procedure Laterality Date    NEPHROSTOMY  2019    VASECTOMY       Current Outpatient Medications   Medication Sig Dispense Refill    ramipril (ALTACE) 5 MG capsule Take 1 capsule by mouth in the morning and at bedtime 180 capsule 1    rosuvastatin (CRESTOR) 40 MG tablet Take 1 tablet by mouth every evening 180 tablet 1     No current facility-administered medications for this visit. No Known Allergies  Social History     Socioeconomic History    Marital status:      Spouse name: Not on file    Number of children: Not on file    Years of education: Not on file    Highest education level: Not on file   Occupational History    Not on file   Tobacco Use    Smoking status: Never Smoker    Smokeless tobacco: Never Used   Vaping Use    Vaping Use: Never used   Substance and Sexual Activity    Alcohol use:  Yes    Drug use: No    Sexual activity: Not on file   Other Topics Concern    Not on file   Social History Narrative    Not on file     Social Determinants of Health     Financial Resource Strain:     Difficulty of Paying Living Expenses: Not on file   Food Insecurity:     Worried About Running Out of Food in the Last Year: Not on file    Rupert of Food in the Last Year: Not on file   Transportation Needs:     Lack of Transportation (Medical): Not on file    Lack of Transportation (Non-Medical): Not on file   Physical Activity:     Days of Exercise per Week: Not on file    Minutes of Exercise per Session: Not on file   Stress:     Feeling of Stress : Not on file   Social Connections:     Frequency of Communication with Friends and Family: Not on file    Frequency of Social Gatherings with Friends and Family: Not on file    Attends Denominational Services: Not on file    Active Member of [address] [place] [address] [employer] or Organizations: Not on file    Attends Club or Organization Meetings: Not on file    Marital Status: Not on file   Intimate Partner Violence:     Fear of Current or Ex-Partner: Not on file    Emotionally Abused: Not on file    Physically Abused: Not on file    Sexually Abused: Not on file   Housing Stability:     Unable to Pay for Housing in the Last Year: Not on file    Number of [place] in the Last Year: Not on file    Unstable Housing in the Last Year: Not on file     Family History   Problem Relation Age of Onset    Heart Disease Brother         Stent in LAD    Lung Disease Father         lung disease    High Cholesterol Mother     Heart Disease Mother     Liver Disease Mother     Diabetes Mother     Hypertension Mother        Review of Systems  Constitutional:   Negative for fever. GI: Positive for abdominal pain. Musculoskeletal:  Negative for back pain.       Urinalysis  UA - Dipstick  Results for orders placed or performed in visit on 06/16/22   AMB POC URINALYSIS DIP STICK AUTO W/O MICRO   Result Value Ref Range    Color (UA POC)      Clarity (UA POC)      Glucose, Urine, POC Negative Negative    Bilirubin, Urine, POC Negative Negative    KETONES, Urine, POC Negative Negative    Specific Gravity, Urine, POC 1.030 1.001 - 1.035    Blood (UA POC) Trace Negative    pH, Urine, POC 5.5 4.6 - 8.0    Protein, Urine, POC Negative Negative    Urobilinogen, POC 0.2 mg/dL     Nitrite, Urine, POC Negative Negative    Leukocyte Esterase, Urine, POC Negative Negative       UA - Micro  WBC - 0  RBC - 0  Bacteria - 0  Epith - 0    PHYSICAL EXAM    General appearance - well appearing and in no distress  Mental status - alert, oriented to person, place, and time  Neck - supple, no significant adenopathy  Chest/Lung-  Quiet, even and easy respiratory effort without use of accessory muscles  Skin - normal coloration and turgor, no rashes      Assessment and Plan    ICD-10-CM    1. Renal cancer, right (HCC)  C64.1 AMB POC URINALYSIS DIP STICK AUTO W/O MICRO   2. Right sided abdominal pain  R10.9        RCC- needs f/u imaging for surveillance. CT a/p wo contrast ordered (no contrast d/t creatinine 1.5)    R sided abd pain- HOLD on rx management today. CT will f/u on source of pain. Urine normal.    Follow up pending CT results. Advised to call sooner if needed. Teresa Villegas, FNP, APRN - CNP  Dr. Sandra Rodriguez is supervising physician today and he approves plan of care.

## 2022-06-30 ENCOUNTER — HOSPITAL ENCOUNTER (OUTPATIENT)
Dept: CT IMAGING | Age: 52
Discharge: HOME | End: 2022-07-03
Payer: COMMERCIAL

## 2022-06-30 DIAGNOSIS — C64.1 RENAL CANCER, RIGHT (HCC): ICD-10-CM

## 2022-06-30 DIAGNOSIS — R10.9 RIGHT SIDED ABDOMINAL PAIN: ICD-10-CM

## 2022-06-30 PROCEDURE — 74176 CT ABD & PELVIS W/O CONTRAST: CPT

## 2022-07-05 ENCOUNTER — OFFICE VISIT (OUTPATIENT)
Dept: ENT CLINIC | Age: 52
End: 2022-07-05
Payer: COMMERCIAL

## 2022-07-05 ENCOUNTER — PREP FOR PROCEDURE (OUTPATIENT)
Dept: ENT CLINIC | Age: 52
End: 2022-07-05

## 2022-07-05 VITALS
BODY MASS INDEX: 32.47 KG/M2 | HEIGHT: 66 IN | SYSTOLIC BLOOD PRESSURE: 122 MMHG | WEIGHT: 202 LBS | DIASTOLIC BLOOD PRESSURE: 80 MMHG

## 2022-07-05 DIAGNOSIS — J34.2 NASAL SEPTAL DEVIATION: ICD-10-CM

## 2022-07-05 DIAGNOSIS — J34.89 NASAL OBSTRUCTION: ICD-10-CM

## 2022-07-05 DIAGNOSIS — J34.3 HYPERTROPHY OF BOTH INFERIOR NASAL TURBINATES: ICD-10-CM

## 2022-07-05 DIAGNOSIS — J34.89 NASAL VALVE COLLAPSE: ICD-10-CM

## 2022-07-05 DIAGNOSIS — J30.9 ALLERGIC RHINITIS, UNSPECIFIED SEASONALITY, UNSPECIFIED TRIGGER: Primary | ICD-10-CM

## 2022-07-05 PROBLEM — M95.0 NASAL VALVE COLLAPSE: Status: ACTIVE | Noted: 2022-07-05

## 2022-07-05 PROBLEM — J34.829 NASAL VALVE COLLAPSE: Status: ACTIVE | Noted: 2022-07-05

## 2022-07-05 PROCEDURE — 31231 NASAL ENDOSCOPY DX: CPT | Performed by: STUDENT IN AN ORGANIZED HEALTH CARE EDUCATION/TRAINING PROGRAM

## 2022-07-05 PROCEDURE — 99204 OFFICE O/P NEW MOD 45 MIN: CPT | Performed by: STUDENT IN AN ORGANIZED HEALTH CARE EDUCATION/TRAINING PROGRAM

## 2022-07-05 ASSESSMENT — ENCOUNTER SYMPTOMS
ABDOMINAL PAIN: 0
SHORTNESS OF BREATH: 0
VOICE CHANGE: 0

## 2022-07-05 NOTE — PROGRESS NOTES
[facility] Office Note    Patient: Srinivasan Hairston  MRN: [id number]  : 1970  Gender:  male  Vital Signs: /80   Ht 5' 6\" (1.676 m)   Wt 202 lb (91.6 kg)   BMI 32.60 kg/m²   Date: 2022    CC:   Chief Complaint   Patient presents with    Nasal Congestion     Patient presents today with c/o R sided deviated septum , he states that this is beginning to effect his sleep quality. HPI:  Srinivasan Hairston is a 47 y.o. male who endorses longstanding bilateral nasal obstruction. This is negatively affecting his sleep. He has tried nasal steroid sprays w/o benefit. He denies recurrent sinus infections. Past Medical History, Past Surgical History, Family history, Social History, and Medications were all reviewed with the patient today and updated as necessary. No Known Allergies  Patient Active Problem List   Diagnosis    Latent hyperopia of both eyes    Renal mass    H/O right nephrectomy    Hyperlipidemia    HTN (hypertension)    Renal cancer, right (HCC)    Cortical cataract of both eyes    Presbyopia of both eyes    Stage 3a chronic kidney disease ([address]    Second hand tobacco smoke exposure    Nasal obstruction    Deviated nasal septum    Hypertrophy of both inferior nasal turbinates    Nasal valve collapse     Current Outpatient Medications   Medication Sig    ramipril (ALTACE) 5 MG capsule Take 1 capsule by mouth in the morning and at bedtime    rosuvastatin (CRESTOR) 40 MG tablet Take 1 tablet by mouth every evening     No current facility-administered medications for this visit.      Past Medical History:   Diagnosis Date    HTN (hypertension) 2012    Hyperlipidemia 2012    Kidney stones      Social History     Tobacco Use    Smoking status: Never Smoker    Smokeless tobacco: Never Used   Substance Use Topics    Alcohol use: Yes     Past Surgical History:   Procedure Laterality Date    NEPHROSTOMY  2019    VASECTOMY       Family History Problem Relation Age of Onset    Heart Disease Brother         Stent in LAD    Lung Disease Father         lung disease    High Cholesterol Mother     Heart Disease Mother     Liver Disease Mother     Diabetes Mother     Hypertension Mother         ROS:    Review of Systems   Constitutional: Negative for fever. HENT: Positive for congestion. Negative for voice change. Eyes: Negative for visual disturbance. Respiratory: Negative for shortness of breath. Cardiovascular: Negative for chest pain. Gastrointestinal: Negative for abdominal pain. Endocrine: Negative for cold intolerance. Genitourinary: Negative for difficulty urinating. Musculoskeletal: Negative for arthralgias. Skin: Negative for rash. Neurological: Negative for dizziness and headaches. Hematological: Negative for adenopathy. Psychiatric/Behavioral: Negative for agitation. PHYSICAL EXAM:    /80   Ht 5' 6\" (1.676 m)   Wt 202 lb (91.6 kg)   BMI 32.60 kg/m²     General: NAD, well-appearing  Neuro: No gross neuro deficits. CN's II-XII intact. No facial weakness. Eyes: EOMI. Pupils reactive. No periorbital edema/ecchymosis. Skin: No facial erythema, rashes or concerning lesions. Nose: No external deviations or saddling. Intranasally, septum is deviated to the left, bilateral inferior turbinate hypertrophy, left-sided nasal valve collapse that is improved with the modified Martir maneuver  Mouth: Moist mucus membranes, normal tongue/palate mobility, no concerning mucosal lesions. Oropharynx: clear with no erythema/exudate, no tonsillar hypertrophy. Ears: Normal appearing auricles, no hematomas. EACs clear with no cerumen impaction, healthy canal skin, TM's intact with no perforations or retraction pockets. No middle ear effusions. Neck: Soft, supple, no palpable lateral neck masses. No parotid or submandibular masses. No thyromegaly or palpable thyroid nodules. No surgical scars.   Lymphatics: No palpable cervical LAD. Resp/Lungs: No audible stridor or wheezing, CTAB  Heart: RRR  Extremities: No clubbing or cyanosis. PROCEDURE: Nasal endoscopy  INDICATIONS: nasal obstruction  DESCRIPTION: After verbal consent was obtained and a timeout was performed, the 0 degree rigid nasal endoscope was advanced into both nares. The septum was deviated to the left w/ no perforations. Bilateral inferior turbinate hypertrophy. There were no masses seen along the nasal floor or within the nasopharynx. On the R side, the mucosa was healthy and the turbinates were intact. The middle meatus was clear w/ no edema, polyps or mucopurulence. On the L side, the mucosa was healthy and the turbinates were intact. The middle meatus was clear w/ no edema, polyps or mucopurulence. The sphenoethmoidal recess was examined bilaterally and was free of pus or polyposis. The scope was then removed and the patient tolerated the procedure well w/ no complications. Lab Results   Component Value Date    WBC 5.8 06/06/2022    HGB 15.0 06/06/2022    HCT 44.6 06/06/2022    MCV 94.3 06/06/2022     06/06/2022     Lab Results   Component Value Date/Time     06/06/2022 09:55 AM    K 4.4 06/06/2022 09:55 AM     06/06/2022 09:55 AM    CO2 27 06/06/2022 09:55 AM    BUN 19 06/06/2022 09:55 AM    CREATININE 1.50 06/06/2022 09:55 AM    GLUCOSE 105 06/06/2022 09:55 AM    CALCIUM 9.8 06/06/2022 09:55 AM        ASSESSMENT and PLAN  50-year-old male with nasal obstruction. He has a left-sided nasal septal deviation, bilateral inferior turbinate hypertrophy, and left-sided nasal valve collapse. I recommended septoplasty, turbinate reduction, and vivaer. Risk include bleeding, infection, septal perforation, dental numbness, and continued nasal obstruction. He understands and agrees to proceed. ICD-10-CM    1. Allergic rhinitis, unspecified seasonality, unspecified trigger  J30.9    2. Nasal septal deviation  J34.2    3. Hypertrophy of both inferior nasal turbinates  J34.3    4. Nasal obstruction  J34.89 NASAL ENDOSCOPY,DX   5.  Nasal valve collapse  J34.89          Fantasma Salcido MD  7/5/2022  Electronically signed

## 2022-07-11 LAB
ALBUMIN SERPL-MCNC: 4.2 G/DL (ref 3.5–5)
ALBUMIN/GLOB SERPL: 1.4 {RATIO} (ref 1.1–2.2)
ALP SERPL-CCNC: 75 U/L (ref 50–136)
ALT SERPL-CCNC: 47 U/L (ref 30–65)
ANION GAP SERPL CALC-SCNC: 6 MMOL/L (ref 5–15)
AST SERPL-CCNC: 23 U/L (ref 15–37)
BILIRUB SERPL-MCNC: 1 MG/DL
BUN SERPL-MCNC: 19 MG/DL (ref 6–20)
CALCIUM SERPL-MCNC: 9.8 MG/DL (ref 8.5–10.1)
CHLORIDE SERPL-SCNC: 108 MMOL/L (ref 97–108)
CO2 SERPL-SCNC: 27 MMOL/L (ref 21–32)
CREAT SERPL-MCNC: 1.5 MG/DL (ref 0.7–1.3)
GLOBULIN SER CALC-MCNC: 2.9 G/DL (ref 2–4)
GLUCOSE SERPL-MCNC: 105 MG/DL (ref 50–100)
POTASSIUM SERPL-SCNC: 4.4 MMOL/L (ref 3.5–5.1)
PROT SERPL-MCNC: 7.1 G/DL (ref 6.4–8.2)
SODIUM SERPL-SCNC: 141 MMOL/L (ref 136–145)

## 2022-08-30 NOTE — PERIOP NOTE
Patient verified name and . Order for consent not found in EHR; patient verifies procedure. Type 1B surgery, phone assessment complete. Orders not received. Labs per surgeon: No orders received. Labs per anesthesia protocol: None. Patient answered medical/surgical history questions at their best of ability. All prior to admission medications documented in [facility]. Patient instructed to take the following medications the day of surgery according to anesthesia guidelines with a small sip of water: NONE. On the day before surgery please take Acetaminophen 1000mg in the morning and then again before bed. You may substitute for Tylenol 650 mg. Hold all vitamins 7 days prior to surgery and NSAIDS 5 days prior to surgery. Patient instructed on the following:    > Arrive at [facility], time of arrival to be called the day before by 1700  > NPO after midnight, unless otherwise indicated, including gum, mints, and ice chips  > Responsible adult must drive patient to the hospital, stay during surgery, and patient will need supervision 24 hours after anesthesia  > Use anti-bacterial soap in shower the night before surgery and on the morning of surgery  > All piercings must be removed prior to arrival.    > Leave all valuables (money and jewelry) at home but bring insurance card and ID on DOS.   > You may be required to pay a deductible or co-pay on the day of your procedure. You can pre-pay by calling [phone] if your surgery is at the [facility] or [phone] if your surgery is at the [facility]. > Do not wear make-up, nail polish, lotions, cologne, perfumes, powders, or oil on skin. Artificial nails are not permitted.

## 2022-09-01 ENCOUNTER — ANESTHESIA EVENT (OUTPATIENT)
Dept: SURGERY | Age: 52
End: 2022-09-01
Payer: COMMERCIAL

## 2022-09-01 ENCOUNTER — PREP FOR PROCEDURE (OUTPATIENT)
Dept: SURGERY | Age: 52
End: 2022-09-01

## 2022-09-02 ENCOUNTER — TELEPHONE (OUTPATIENT)
Dept: UROLOGY | Age: 52
End: 2022-09-02

## 2022-09-02 ENCOUNTER — HOSPITAL ENCOUNTER (OUTPATIENT)
Age: 52
Setting detail: OUTPATIENT SURGERY
Discharge: HOME | End: 2022-09-02
Attending: STUDENT IN AN ORGANIZED HEALTH CARE EDUCATION/TRAINING PROGRAM | Admitting: STUDENT IN AN ORGANIZED HEALTH CARE EDUCATION/TRAINING PROGRAM
Payer: COMMERCIAL

## 2022-09-02 ENCOUNTER — ANESTHESIA (OUTPATIENT)
Dept: SURGERY | Age: 52
End: 2022-09-02
Payer: COMMERCIAL

## 2022-09-02 VITALS
BODY MASS INDEX: 32.53 KG/M2 | OXYGEN SATURATION: 97 % | HEART RATE: 62 BPM | WEIGHT: 202.4 LBS | HEIGHT: 66 IN | DIASTOLIC BLOOD PRESSURE: 75 MMHG | RESPIRATION RATE: 17 BRPM | SYSTOLIC BLOOD PRESSURE: 156 MMHG | TEMPERATURE: 96.9 F

## 2022-09-02 DIAGNOSIS — J34.89 NASAL OBSTRUCTION: ICD-10-CM

## 2022-09-02 DIAGNOSIS — J34.2 DEVIATED NASAL SEPTUM: ICD-10-CM

## 2022-09-02 DIAGNOSIS — G89.18 POST-OP PAIN: Primary | ICD-10-CM

## 2022-09-02 DIAGNOSIS — J34.3 HYPERTROPHY OF BOTH INFERIOR NASAL TURBINATES: ICD-10-CM

## 2022-09-02 DIAGNOSIS — J34.89 NASAL VALVE COLLAPSE: ICD-10-CM

## 2022-09-02 PROCEDURE — 30520 REPAIR OF NASAL SEPTUM: CPT | Performed by: STUDENT IN AN ORGANIZED HEALTH CARE EDUCATION/TRAINING PROGRAM

## 2022-09-02 PROCEDURE — 6360000002 HC RX W HCPCS: Performed by: ANESTHESIOLOGY

## 2022-09-02 PROCEDURE — 7100000010 HC PHASE II RECOVERY - FIRST 15 MIN: Performed by: STUDENT IN AN ORGANIZED HEALTH CARE EDUCATION/TRAINING PROGRAM

## 2022-09-02 PROCEDURE — 2709999900 HC NON-CHARGEABLE SUPPLY: Performed by: STUDENT IN AN ORGANIZED HEALTH CARE EDUCATION/TRAINING PROGRAM

## 2022-09-02 PROCEDURE — 2720000010 HC SURG SUPPLY STERILE: Performed by: STUDENT IN AN ORGANIZED HEALTH CARE EDUCATION/TRAINING PROGRAM

## 2022-09-02 PROCEDURE — 3700000001 HC ADD 15 MINUTES (ANESTHESIA): Performed by: STUDENT IN AN ORGANIZED HEALTH CARE EDUCATION/TRAINING PROGRAM

## 2022-09-02 PROCEDURE — 7100000011 HC PHASE II RECOVERY - ADDTL 15 MIN: Performed by: STUDENT IN AN ORGANIZED HEALTH CARE EDUCATION/TRAINING PROGRAM

## 2022-09-02 PROCEDURE — 30140 RESECT INFERIOR TURBINATE: CPT | Performed by: STUDENT IN AN ORGANIZED HEALTH CARE EDUCATION/TRAINING PROGRAM

## 2022-09-02 PROCEDURE — 6370000000 HC RX 637 (ALT 250 FOR IP): Performed by: STUDENT IN AN ORGANIZED HEALTH CARE EDUCATION/TRAINING PROGRAM

## 2022-09-02 PROCEDURE — 6360000002 HC RX W HCPCS: Performed by: NURSE ANESTHETIST, CERTIFIED REGISTERED

## 2022-09-02 PROCEDURE — 2500000003 HC RX 250 WO HCPCS: Performed by: NURSE ANESTHETIST, CERTIFIED REGISTERED

## 2022-09-02 PROCEDURE — 2580000003 HC RX 258: Performed by: ANESTHESIOLOGY

## 2022-09-02 PROCEDURE — 3600000014 HC SURGERY LEVEL 4 ADDTL 15MIN: Performed by: STUDENT IN AN ORGANIZED HEALTH CARE EDUCATION/TRAINING PROGRAM

## 2022-09-02 PROCEDURE — 3700000000 HC ANESTHESIA ATTENDED CARE: Performed by: STUDENT IN AN ORGANIZED HEALTH CARE EDUCATION/TRAINING PROGRAM

## 2022-09-02 PROCEDURE — 6370000000 HC RX 637 (ALT 250 FOR IP): Performed by: ANESTHESIOLOGY

## 2022-09-02 PROCEDURE — 7100000001 HC PACU RECOVERY - ADDTL 15 MIN: Performed by: STUDENT IN AN ORGANIZED HEALTH CARE EDUCATION/TRAINING PROGRAM

## 2022-09-02 PROCEDURE — 3600000004 HC SURGERY LEVEL 4 BASE: Performed by: STUDENT IN AN ORGANIZED HEALTH CARE EDUCATION/TRAINING PROGRAM

## 2022-09-02 PROCEDURE — 2500000003 HC RX 250 WO HCPCS: Performed by: STUDENT IN AN ORGANIZED HEALTH CARE EDUCATION/TRAINING PROGRAM

## 2022-09-02 PROCEDURE — 7100000000 HC PACU RECOVERY - FIRST 15 MIN: Performed by: STUDENT IN AN ORGANIZED HEALTH CARE EDUCATION/TRAINING PROGRAM

## 2022-09-02 RX ORDER — LIDOCAINE HYDROCHLORIDE 20 MG/ML
INJECTION, SOLUTION EPIDURAL; INFILTRATION; INTRACAUDAL; PERINEURAL PRN
Status: DISCONTINUED | OUTPATIENT
Start: 2022-09-02 | End: 2022-09-02 | Stop reason: SDUPTHER

## 2022-09-02 RX ORDER — OXYCODONE HYDROCHLORIDE 5 MG/1
5 TABLET ORAL PRN
Status: DISCONTINUED | OUTPATIENT
Start: 2022-09-02 | End: 2022-09-02 | Stop reason: HOSPADM

## 2022-09-02 RX ORDER — SODIUM CHLORIDE 0.9 % (FLUSH) 0.9 %
5-40 SYRINGE (ML) INJECTION PRN
Status: DISCONTINUED | OUTPATIENT
Start: 2022-09-02 | End: 2022-09-02 | Stop reason: HOSPADM

## 2022-09-02 RX ORDER — PROPOFOL 10 MG/ML
INJECTION, EMULSION INTRAVENOUS PRN
Status: DISCONTINUED | OUTPATIENT
Start: 2022-09-02 | End: 2022-09-02 | Stop reason: SDUPTHER

## 2022-09-02 RX ORDER — ONDANSETRON 2 MG/ML
4 INJECTION INTRAMUSCULAR; INTRAVENOUS
Status: DISCONTINUED | OUTPATIENT
Start: 2022-09-02 | End: 2022-09-02 | Stop reason: HOSPADM

## 2022-09-02 RX ORDER — NEOSTIGMINE METHYLSULFATE 1 MG/ML
INJECTION, SOLUTION INTRAVENOUS PRN
Status: DISCONTINUED | OUTPATIENT
Start: 2022-09-02 | End: 2022-09-02 | Stop reason: SDUPTHER

## 2022-09-02 RX ORDER — DIPHENHYDRAMINE HYDROCHLORIDE 50 MG/ML
12.5 INJECTION INTRAMUSCULAR; INTRAVENOUS
Status: DISCONTINUED | OUTPATIENT
Start: 2022-09-02 | End: 2022-09-02 | Stop reason: HOSPADM

## 2022-09-02 RX ORDER — DEXAMETHASONE SODIUM PHOSPHATE 10 MG/ML
INJECTION INTRAMUSCULAR; INTRAVENOUS PRN
Status: DISCONTINUED | OUTPATIENT
Start: 2022-09-02 | End: 2022-09-02 | Stop reason: SDUPTHER

## 2022-09-02 RX ORDER — ROCURONIUM BROMIDE 10 MG/ML
INJECTION, SOLUTION INTRAVENOUS PRN
Status: DISCONTINUED | OUTPATIENT
Start: 2022-09-02 | End: 2022-09-02 | Stop reason: SDUPTHER

## 2022-09-02 RX ORDER — GINSENG 100 MG
CAPSULE ORAL PRN
Status: DISCONTINUED | OUTPATIENT
Start: 2022-09-02 | End: 2022-09-02 | Stop reason: ALTCHOICE

## 2022-09-02 RX ORDER — SODIUM CHLORIDE, SODIUM LACTATE, POTASSIUM CHLORIDE, CALCIUM CHLORIDE 600; 310; 30; 20 MG/100ML; MG/100ML; MG/100ML; MG/100ML
INJECTION, SOLUTION INTRAVENOUS CONTINUOUS
Status: DISCONTINUED | OUTPATIENT
Start: 2022-09-02 | End: 2022-09-02 | Stop reason: HOSPADM

## 2022-09-02 RX ORDER — ACETAMINOPHEN 500 MG
1000 TABLET ORAL ONCE
Status: COMPLETED | OUTPATIENT
Start: 2022-09-02 | End: 2022-09-02

## 2022-09-02 RX ORDER — GLYCOPYRROLATE 0.2 MG/ML
INJECTION INTRAMUSCULAR; INTRAVENOUS PRN
Status: DISCONTINUED | OUTPATIENT
Start: 2022-09-02 | End: 2022-09-02 | Stop reason: SDUPTHER

## 2022-09-02 RX ORDER — LIDOCAINE HYDROCHLORIDE AND EPINEPHRINE 10; 10 MG/ML; UG/ML
INJECTION, SOLUTION INFILTRATION; PERINEURAL PRN
Status: DISCONTINUED | OUTPATIENT
Start: 2022-09-02 | End: 2022-09-02 | Stop reason: ALTCHOICE

## 2022-09-02 RX ORDER — OXYCODONE HYDROCHLORIDE 5 MG/1
10 TABLET ORAL PRN
Status: DISCONTINUED | OUTPATIENT
Start: 2022-09-02 | End: 2022-09-02 | Stop reason: HOSPADM

## 2022-09-02 RX ORDER — AMOXICILLIN AND CLAVULANATE POTASSIUM 875; 125 MG/1; MG/1
1 TABLET, FILM COATED ORAL 2 TIMES DAILY
Qty: 14 TABLET | Refills: 0 | Status: SHIPPED | OUTPATIENT
Start: 2022-09-02 | End: 2022-09-09

## 2022-09-02 RX ORDER — FENTANYL CITRATE 50 UG/ML
100 INJECTION, SOLUTION INTRAMUSCULAR; INTRAVENOUS
Status: DISCONTINUED | OUTPATIENT
Start: 2022-09-02 | End: 2022-09-02 | Stop reason: HOSPADM

## 2022-09-02 RX ORDER — OXYMETAZOLINE HYDROCHLORIDE 0.05 G/100ML
SPRAY NASAL PRN
Status: DISCONTINUED | OUTPATIENT
Start: 2022-09-02 | End: 2022-09-02 | Stop reason: ALTCHOICE

## 2022-09-02 RX ORDER — ONDANSETRON 2 MG/ML
INJECTION INTRAMUSCULAR; INTRAVENOUS PRN
Status: DISCONTINUED | OUTPATIENT
Start: 2022-09-02 | End: 2022-09-02 | Stop reason: SDUPTHER

## 2022-09-02 RX ORDER — OXYCODONE HYDROCHLORIDE AND ACETAMINOPHEN 5; 325 MG/1; MG/1
1 TABLET ORAL EVERY 6 HOURS PRN
Qty: 24 TABLET | Refills: 0 | Status: SHIPPED | OUTPATIENT
Start: 2022-09-02 | End: 2022-09-08

## 2022-09-02 RX ORDER — MIDAZOLAM HYDROCHLORIDE 2 MG/2ML
2 INJECTION, SOLUTION INTRAMUSCULAR; INTRAVENOUS
Status: COMPLETED | OUTPATIENT
Start: 2022-09-02 | End: 2022-09-02

## 2022-09-02 RX ORDER — ONDANSETRON 4 MG/1
4 TABLET, FILM COATED ORAL 3 TIMES DAILY PRN
Qty: 15 TABLET | Refills: 0 | Status: SHIPPED | OUTPATIENT
Start: 2022-09-02

## 2022-09-02 RX ORDER — SODIUM CHLORIDE 0.9 % (FLUSH) 0.9 %
5-40 SYRINGE (ML) INJECTION EVERY 12 HOURS SCHEDULED
Status: DISCONTINUED | OUTPATIENT
Start: 2022-09-02 | End: 2022-09-02 | Stop reason: HOSPADM

## 2022-09-02 RX ORDER — HYDROMORPHONE HYDROCHLORIDE 1 MG/ML
0.5 INJECTION, SOLUTION INTRAMUSCULAR; INTRAVENOUS; SUBCUTANEOUS EVERY 10 MIN PRN
Status: DISCONTINUED | OUTPATIENT
Start: 2022-09-02 | End: 2022-09-02 | Stop reason: HOSPADM

## 2022-09-02 RX ORDER — HYDROMORPHONE HYDROCHLORIDE 1 MG/ML
0.25 INJECTION, SOLUTION INTRAMUSCULAR; INTRAVENOUS; SUBCUTANEOUS EVERY 5 MIN PRN
Status: DISCONTINUED | OUTPATIENT
Start: 2022-09-02 | End: 2022-09-02 | Stop reason: HOSPADM

## 2022-09-02 RX ORDER — FENTANYL CITRATE 50 UG/ML
INJECTION, SOLUTION INTRAMUSCULAR; INTRAVENOUS PRN
Status: DISCONTINUED | OUTPATIENT
Start: 2022-09-02 | End: 2022-09-02 | Stop reason: SDUPTHER

## 2022-09-02 RX ADMIN — MIDAZOLAM 2 MG: 1 INJECTION INTRAMUSCULAR; INTRAVENOUS at 09:38

## 2022-09-02 RX ADMIN — GLYCOPYRROLATE 0.2 MG: 0.2 INJECTION, SOLUTION INTRAMUSCULAR; INTRAVENOUS at 10:20

## 2022-09-02 RX ADMIN — ONDANSETRON 4 MG: 2 INJECTION INTRAMUSCULAR; INTRAVENOUS at 10:15

## 2022-09-02 RX ADMIN — GLYCOPYRROLATE 0.4 MG: 0.2 INJECTION, SOLUTION INTRAMUSCULAR; INTRAVENOUS at 11:13

## 2022-09-02 RX ADMIN — FENTANYL CITRATE 100 MCG: 50 INJECTION, SOLUTION INTRAMUSCULAR; INTRAVENOUS at 10:04

## 2022-09-02 RX ADMIN — LIDOCAINE HYDROCHLORIDE 100 MG: 20 INJECTION, SOLUTION EPIDURAL; INFILTRATION; INTRACAUDAL; PERINEURAL at 10:04

## 2022-09-02 RX ADMIN — ACETAMINOPHEN 1000 MG: 500 TABLET, FILM COATED ORAL at 08:42

## 2022-09-02 RX ADMIN — ROCURONIUM BROMIDE 40 MG: 50 INJECTION, SOLUTION INTRAVENOUS at 10:04

## 2022-09-02 RX ADMIN — SODIUM CHLORIDE, SODIUM LACTATE, POTASSIUM CHLORIDE, AND CALCIUM CHLORIDE: 600; 310; 30; 20 INJECTION, SOLUTION INTRAVENOUS at 09:38

## 2022-09-02 RX ADMIN — NEOSTIGMINE METHYLSULFATE 3 MG: 1 INJECTION, SOLUTION INTRAVENOUS at 11:13

## 2022-09-02 RX ADMIN — PROPOFOL 200 MG: 10 INJECTION, EMULSION INTRAVENOUS at 10:04

## 2022-09-02 RX ADMIN — DEXAMETHASONE SODIUM PHOSPHATE 10 MG: 10 INJECTION INTRAMUSCULAR; INTRAVENOUS at 10:15

## 2022-09-02 RX ADMIN — SODIUM CHLORIDE, SODIUM LACTATE, POTASSIUM CHLORIDE, AND CALCIUM CHLORIDE: 600; 310; 30; 20 INJECTION, SOLUTION INTRAVENOUS at 09:55

## 2022-09-02 RX ADMIN — SODIUM CHLORIDE, SODIUM LACTATE, POTASSIUM CHLORIDE, AND CALCIUM CHLORIDE: 600; 310; 30; 20 INJECTION, SOLUTION INTRAVENOUS at 11:03

## 2022-09-02 ASSESSMENT — PAIN - FUNCTIONAL ASSESSMENT: PAIN_FUNCTIONAL_ASSESSMENT: 0-10

## 2022-09-02 ASSESSMENT — PAIN SCALES - GENERAL: PAINLEVEL_OUTOF10: 2

## 2022-09-02 ASSESSMENT — PAIN DESCRIPTION - LOCATION: LOCATION: NOSE

## 2022-09-02 NOTE — ANESTHESIA PRE PROCEDURE
Department of Anesthesiology  Preprocedure Note       Name:  Chad Muñoz   Age:  47 y.o.  :  1970                                          MRN:  [id number]         Date:  2022      Surgeon: Antonio Ramírez):  Lillian Jackson MD    Procedure: Procedure(s):  SINUS ENDOSCOPY FUNCTIONAL SURGERY Septoplasty, Bilateral Submucous Resection Inferior Turbinates  Radiofrequency Ablation to Nasal  Sidewall with Laruth Loco for Nasal Valve Collapse    Medications prior to admission:   Prior to Admission medications    Medication Sig Start Date End Date Taking?  Authorizing Provider   ramipril (ALTACE) 5 MG capsule Take 1 capsule by mouth in the morning and at bedtime 22   Raul Henley MD   rosuvastatin (CRESTOR) 40 MG tablet Take 1 tablet by mouth every evening 22   Raul Henley MD       Current medications:    Current Facility-Administered Medications   Medication Dose Route Frequency Provider Last Rate Last Admin    fentaNYL (SUBLIMAZE) injection 100 mcg  100 mcg IntraVENous Once PRN Vinicio Solomon MD        lactated ringers infusion   IntraVENous Continuous Vinicio Solomon MD        sodium chloride flush 0.9 % injection 5-40 mL  5-40 mL IntraVENous 2 times per day Vinicio Solomon MD        sodium chloride flush 0.9 % injection 5-40 mL  5-40 mL IntraVENous PRN Vinicio Solomon MD        midazolam PF (VERSED) injection 2 mg  2 mg IntraVENous Once PRN Vinicio Solomon MD           Allergies:  No Known Allergies    Problem List:    Patient Active Problem List   Diagnosis Code    Latent hyperopia of both eyes H52.03    Renal mass N28.89    H/O right nephrectomy Z90.5    Hyperlipidemia E78.5    HTN (hypertension) I10    Renal cancer, right ([address] C64.1    Cortical cataract of both eyes H26.9    Presbyopia of both eyes H52.4    Stage 3a chronic kidney disease ([address] N18.31    Second hand tobacco smoke exposure Z77.22    Nasal obstruction J34.89    Deviated nasal septum J34.2    Hypertrophy of both inferior nasal turbinates J34.3    Nasal valve collapse J34.89       Past Medical History:        Diagnosis Date    History of kidney cancer     HTN (hypertension) 11/17/2012    Hyperlipidemia 11/17/2012    Kidney stones        Past Surgical History:        Procedure Laterality Date    KIDNEY REMOVAL Right     VASECTOMY         Social History:    Social History     Tobacco Use    Smoking status: Never    Smokeless tobacco: Never   Substance Use Topics    Alcohol use: Yes                                Counseling given: Not Answered      Vital Signs (Current):   Vitals:    08/30/22 1405 09/02/22 0826   BP:  (!) 130/91   Pulse:  54   Resp:  16   Temp:  98.2 °F (36.8 °C)   TempSrc:  Temporal   SpO2:  99%   Weight: 199 lb (90.3 kg) 202 lb 6.4 oz (91.8 kg)   Height: 5' 6\" (1.676 m)                                               BP Readings from Last 3 Encounters:   09/02/22 (!) 130/91   07/05/22 122/80   06/06/22 (!) 137/91       NPO Status: Time of last liquid consumption: 2100                        Time of last solid consumption: 2100                        Date of last liquid consumption: 09/01/22                        Date of last solid food consumption: 09/01/22    BMI:   Wt Readings from Last 3 Encounters:   09/02/22 202 lb 6.4 oz (91.8 kg)   07/05/22 202 lb (91.6 kg)   06/06/22 201 lb 9.6 oz (91.4 kg)     Body mass index is 32.67 kg/m².     CBC:   Lab Results   Component Value Date/Time    WBC 5.8 06/06/2022 09:55 AM    RBC 4.73 06/06/2022 09:55 AM    HGB 15.0 06/06/2022 09:55 AM    HCT 44.6 06/06/2022 09:55 AM    MCV 94.3 06/06/2022 09:55 AM    RDW 13.3 06/06/2022 09:55 AM     06/06/2022 09:55 AM       CMP:   Lab Results   Component Value Date/Time     06/06/2022 09:55 AM    K 4.4 06/06/2022 09:55 AM     06/06/2022 09:55 AM    CO2 27 06/06/2022 09:55 AM    BUN 19 06/06/2022 09:55 AM    CREATININE 1.50 06/06/2022 09:55 AM    GFRAA >60 06/06/2022 09:55 AM    AGRATIO 2.2 09/20/2021 08:44 AM    LABGLOM 52 06/06/2022 09:55 AM    GLUCOSE 105 06/06/2022 09:55 AM    PROT 7.1 06/06/2022 09:55 AM    CALCIUM 9.8 06/06/2022 09:55 AM    BILITOT 1.0 06/06/2022 09:55 AM    ALKPHOS 75 06/06/2022 09:55 AM    ALKPHOS 76 09/20/2021 08:44 AM    AST 23 06/06/2022 09:55 AM    ALT 47 06/06/2022 09:55 AM       POC Tests: No results for input(s): POCGLU, POCNA, POCK, POCCL, POCBUN, POCHEMO, POCHCT in the last 72 hours. Coags:   Lab Results   Component Value Date/Time    PROTIME 13.3 12/04/2019 10:42 AM    INR 1.0 12/04/2019 10:42 AM    APTT 27.3 12/04/2019 10:42 AM       HCG (If Applicable): No results found for: PREGTESTUR, PREGSERUM, HCG, HCGQUANT     ABGs: No results found for: PHART, PO2ART, XEB5JPL, SFU6OQW, BEART, C4YPQLRZ     Type & Screen (If Applicable):  No results found for: LABABO, LABRH    Drug/Infectious Status (If Applicable):  No results found for: HIV, HEPCAB    COVID-19 Screening (If Applicable): No results found for: COVID19        Anesthesia Evaluation  Patient summary reviewed and Nursing notes reviewed no history of anesthetic complications:   Airway: Mallampati: II  TM distance: >3 FB        Dental: normal exam         Pulmonary:Negative Pulmonary ROS breath sounds clear to auscultation                             Cardiovascular:  Exercise tolerance: good (>4 METS),   (+) hypertension:, hyperlipidemia        Rhythm: regular  Rate: normal                    Neuro/Psych:   Negative Neuro/Psych ROS              GI/Hepatic/Renal:   (+) renal disease: CRI,           Endo/Other: Negative Endo/Other ROS   (+) Cancer: kidney. .    Cancer: kidney. Abdominal:             Vascular: negative vascular ROS. Other Findings:           Anesthesia Plan      general     ASA 2       Induction: intravenous. Anesthetic plan and risks discussed with patient.                         Essence Sood MD   9/2/2022

## 2022-09-02 NOTE — DISCHARGE INSTRUCTIONS
Septoplasty         -If needed after surgery, sleep and rest with your head elevated to decrease swelling and pressure.    -The first day or two after surgery, you may have some mild bloody drainage. You can wear a gauze pad under your nose to catch these drippings. This can be changed as needed. CALL THE OFFICE IF YOU HAVE ANY HEAVY OR PERSISTENT BLEEDING    -Numbness to the front teeth, roof of mouth is normal after nasal surgery, and will resolve after about 8 weeks. -VERY IMPORTANT. You will have splints in your nose. These need to be kept clean.      -Nasal irrigation with a normal saline rinse OR SPRAY after surgery is important. This is begun the day of surgery. You cannot do this too much. At a minimum of 5 time a day. -Use plenty of plain saline spray, as well. This helps keep the splints open and clean. -You may irrigate blood clots from your nose for up to 2 weeks after surgery. IRRIGATION IS VERY IMPORTANT. You may use additionally use Afrin 2-3 times daily for the first 3 days to decrease congestion and bleeding.      -You will be taking an oral antibiotic while the splints are in place.    -Do not blow your nose until you've had your post-operative visit and gotten clearance from your doctor.    -If you to need sneeze, sneeze with your mouth open.    -Nasal congestion after nasal surgery is normal and will usually resolve once your splints are removed. Keep in mind, this is not immediate as you will still be swollen and healing.

## 2022-09-02 NOTE — OP NOTE
Operative Note      Patient: Karthikeyan Simon  YOB: 1970  MRN: [id number]    Date of Procedure: 9/2/2022    Pre-Op Diagnosis: Nasal obstruction [J34.89]  Deviated nasal septum [J34.2]  Hypertrophy of both inferior nasal turbinates [J34.3]  Nasal valve collapse [J34.89]    Post-Op Diagnosis: Same       Procedure(s):  SINUS ENDOSCOPY FUNCTIONAL SURGERY Septoplasty, Bilateral Submucous Resection Inferior Turbinates  Radiofrequency Ablation to Nasal  Sidewall with Marco A Akiko for Nasal Valve Collapse    Surgeon(s):  Evelio Hoover MD    Assistant:   * No surgical staff found *    Anesthesia: General    Estimated Blood Loss (mL): less than 317     Complications: None    Specimens:   * No specimens in log *    Implants:  * No implants in log *      Drains: * No LDAs found *    Findings: Left-sided nasal septal deviation, left-sided nasal valve collapse, bilateral inferior turbinate hypertrophy    Detailed Description of Procedure: The patient was identified in preoperative holding area. Informed consent was obtained. The patient was taken back to the operating suite and laid supine on the operating table. Upper and lower extremity pressure points were padded. Anesthesia was induced and the patient was intubated without complication. The patient was draped in the usual fashion. A preoperative timeout was performed. Nose was prepped with Betadine. The septum was anesthetized with 1% lidocaine with 1:100,000 epinephrine. Afrin-soaked pledgets were placed in nasal passages bilaterally and then removed after a few minutes. A left-sided hemitransfixion incision was made using a #15 blade. A combination of the Martir and [place] elevators were used to raise a left-sided submucoperichondrial flap. Next a 0 degree endoscope and suction [place] were used to complete raising the left-sided submucoperichondrial and submucoperiosteal flap.    Next the suction [place] was used to make a vertical incision in the septum approximately 1.5 cm posterior to the most caudal aspect of the septum. A right-sided submucoperichondrial and submucoperiosteal flap was raised using the suction [place]. The double-action scissors were used to make a superior cut in the septum from anterior to posterior making sure to leave a least 1.5 cm of septal strut dorsally. Next the deviated portions of the septum were removed using the [place] forceps. There was deviation inferiorly that was further removed using a Martir and Jennifer forceps. At this point there was significant provement in the nasal patency. Next the head of each inferior turbinate was injected with 1% lidocaine with 1:100,000 epinephrine. #15 blade was used to make a stab incision along the head of the inferior turbinate on both sides. Next a Martir elevator was used to raise submucosal flaps. The microdebrider was then inserted and submucous resection was performed first with the blade-pointing towards the bone and then another pass with the blade pointing towards the submucosa. This was performed bilaterally. Each inferior turbinate was outfractured downward and laterally using a [place] elevator. Next the radiofrequency ablation of the left internal nasal valve was performed. The left internal nasal valve area was injected along its length with 1% lidocaine with 1-1000 epinephrine. Next the radiofrequency ablation device was inserted and radiofrequency ablation was performed at 3 points along the internal nasal valve on the left side. Next the hemitransfixion incision was closed using 4-0 chromic. Mcgarry splints covered in bacitracin were placed and secured with a 3-0 nylon. The patient was extubated and taken the PACU in stable condition.       Electronically signed by Dino Osgood, MD on 9/2/2022 at 11:18 AM

## 2022-09-02 NOTE — H&P
[facility] ENT Office Note     Patient: Nirav Ricks  MRN: [id number]  : 1970  Gender:  male  Vital Signs: /80   Ht 5' 6\" (1.676 m)   Wt 202 lb (91.6 kg)   BMI 32.60 kg/m²   Date: 2022     CC:        Chief Complaint   Patient presents with    Nasal Congestion       Patient presents today with c/o R sided deviated septum , he states that this is beginning to effect his sleep quality. HPI:  Nirav Ricks is a 47 y.o. male who endorses longstanding bilateral nasal obstruction. This is negatively affecting his sleep. He has tried nasal steroid sprays w/o benefit. He denies recurrent sinus infections. Past Medical History, Past Surgical History, Family history, Social History, and Medications were all reviewed with the patient today and updated as necessary. No Known Allergies      Patient Active Problem List   Diagnosis    Latent hyperopia of both eyes    Renal mass    H/O right nephrectomy    Hyperlipidemia    HTN (hypertension)    Renal cancer, right (HCC)    Cortical cataract of both eyes    Presbyopia of both eyes    Stage 3a chronic kidney disease ([address]    Second hand tobacco smoke exposure    Nasal obstruction    Deviated nasal septum    Hypertrophy of both inferior nasal turbinates    Nasal valve collapse           Current Outpatient Medications   Medication Sig    ramipril (ALTACE) 5 MG capsule Take 1 capsule by mouth in the morning and at bedtime    rosuvastatin (CRESTOR) 40 MG tablet Take 1 tablet by mouth every evening      No current facility-administered medications for this visit.       Past Medical History        Past Medical History:   Diagnosis Date    HTN (hypertension) 2012    Hyperlipidemia 2012    Kidney stones           Social History           Tobacco Use    Smoking status: Never Smoker    Smokeless tobacco: Never Used   Substance Use Topics    Alcohol use: Yes      Past Surgical History         Past Surgical History:   Procedure Laterality Date    NEPHROSTOMY   12/11/2019    VASECTOMY             Family History         Family History   Problem Relation Age of Onset    Heart Disease Brother           Stent in LAD    Lung Disease Father           lung disease    High Cholesterol Mother      Heart Disease Mother      Liver Disease Mother      Diabetes Mother      Hypertension Mother              ROS:     Review of Systems   Constitutional: Negative for fever. HENT: Positive for congestion. Negative for voice change. Eyes: Negative for visual disturbance. Respiratory: Negative for shortness of breath. Cardiovascular: Negative for chest pain. Gastrointestinal: Negative for abdominal pain. Endocrine: Negative for cold intolerance. Genitourinary: Negative for difficulty urinating. Musculoskeletal: Negative for arthralgias. Skin: Negative for rash. Neurological: Negative for dizziness and headaches. Hematological: Negative for adenopathy. Psychiatric/Behavioral: Negative for agitation. PHYSICAL EXAM:     /80   Ht 5' 6\" (1.676 m)   Wt 202 lb (91.6 kg)   BMI 32.60 kg/m²      General: NAD, well-appearing  Neuro: No gross neuro deficits. CN's II-XII intact. No facial weakness. Eyes: EOMI. Pupils reactive. No periorbital edema/ecchymosis. Skin: No facial erythema, rashes or concerning lesions. Nose: No external deviations or saddling. Intranasally, septum is deviated to the left, bilateral inferior turbinate hypertrophy, left-sided nasal valve collapse that is improved with the modified Martir maneuver  Mouth: Moist mucus membranes, normal tongue/palate mobility, no concerning mucosal lesions. Oropharynx: clear with no erythema/exudate, no tonsillar hypertrophy. Ears: Normal appearing auricles, no hematomas. EACs clear with no cerumen impaction, healthy canal skin, TM's intact with no perforations or retraction pockets. No middle ear effusions. Neck: Soft, supple, no palpable lateral neck masses.  No parotid or submandibular masses. No thyromegaly or palpable thyroid nodules. No surgical scars. Lymphatics: No palpable cervical LAD. Resp/Lungs: No audible stridor or wheezing, CTAB  Heart: RRR  Extremities: No clubbing or cyanosis. PROCEDURE: Nasal endoscopy  INDICATIONS: nasal obstruction  DESCRIPTION: After verbal consent was obtained and a timeout was performed, the 0 degree rigid nasal endoscope was advanced into both nares. The septum was deviated to the left w/ no perforations. Bilateral inferior turbinate hypertrophy. There were no masses seen along the nasal floor or within the nasopharynx. On the R side, the mucosa was healthy and the turbinates were intact. The middle meatus was clear w/ no edema, polyps or mucopurulence. On the L side, the mucosa was healthy and the turbinates were intact. The middle meatus was clear w/ no edema, polyps or mucopurulence. The sphenoethmoidal recess was examined bilaterally and was free of pus or polyposis. The scope was then removed and the patient tolerated the procedure well w/ no complications. Lab Results   Component Value Date     WBC 5.8 06/06/2022     HGB 15.0 06/06/2022     HCT 44.6 06/06/2022     MCV 94.3 06/06/2022      06/06/2022            Lab Results   Component Value Date/Time      06/06/2022 09:55 AM     K 4.4 06/06/2022 09:55 AM      06/06/2022 09:55 AM     CO2 27 06/06/2022 09:55 AM     BUN 19 06/06/2022 09:55 AM     CREATININE 1.50 06/06/2022 09:55 AM     GLUCOSE 105 06/06/2022 09:55 AM     CALCIUM 9.8 06/06/2022 09:55 AM         ASSESSMENT and PLAN  81-year-old male with nasal obstruction. He has a left-sided nasal septal deviation, bilateral inferior turbinate hypertrophy, and left-sided nasal valve collapse. I recommended septoplasty, turbinate reduction, and vivaer. Risk include bleeding, infection, septal perforation, dental numbness, and continued nasal obstruction.   He understands and agrees to proceed. ICD-10-CM     1. Allergic rhinitis, unspecified seasonality, unspecified trigger  J30.9     2. Nasal septal deviation  J34.2     3. Hypertrophy of both inferior nasal turbinates  J34.3     4. Nasal obstruction  J34.89 NASAL ENDOSCOPY,DX   5.  Nasal valve collapse  J34.89              Patt Nielson MD

## 2022-09-02 NOTE — TELEPHONE ENCOUNTER
Pt had sinus surgery this morning and was told that he had stage 3 kidney disease in the left kidney the pt would like a call to clarify as he has had the R kidney removed //dh

## 2022-09-02 NOTE — ANESTHESIA POSTPROCEDURE EVALUATION
Department of Anesthesiology  Postprocedure Note    Patient: Charles Neely  MRN: [id number]  YOB: 1970  Date of evaluation: 9/2/2022      Procedure Summary     Date: 09/02/22 Room / Location: [facility] MAIN OR 02 / [facility] MAIN OR    Anesthesia Start: 0486 Anesthesia Stop: 1130    Procedures:       SINUS ENDOSCOPY FUNCTIONAL SURGERY Septoplasty, Bilateral Submucous Resection Inferior Turbinates (Bilateral: Nose)      Radiofrequency Ablation to Nasal  Sidewall with Junius Palmer for Nasal Valve Collapse (Bilateral: Nose) Diagnosis:       Nasal obstruction      Deviated nasal septum      Hypertrophy of both inferior nasal turbinates      Nasal valve collapse      (Nasal obstruction [J34.89])      (Deviated nasal septum [J34.2])      (Hypertrophy of both inferior nasal turbinates [J34.3])      (Nasal valve collapse [J34.89])    Surgeons:  Bobbi Street MD Responsible Provider: Pallavi Reyes MD    Anesthesia Type: General ASA Status: 2          Anesthesia Type: General    Carl Phase I: Carl Score: 8    Carl Phase II:        Anesthesia Post Evaluation    Patient location during evaluation: PACU  Patient participation: complete - patient participated  Level of consciousness: awake and alert  Airway patency: patent  Nausea & Vomiting: no nausea  Cardiovascular status: hemodynamically stable  Respiratory status: acceptable  Hydration status: euvolemic

## 2022-09-06 NOTE — TELEPHONE ENCOUNTER
I called and lmom with the answer from Dr. Khang Boswell which was that the classification is based on 2 kidneys .  The pt has one kidney and it continues to work very well //dh

## 2022-09-08 ENCOUNTER — OFFICE VISIT (OUTPATIENT)
Dept: ENT CLINIC | Age: 52
End: 2022-09-08

## 2022-09-08 DIAGNOSIS — J30.9 ALLERGIC RHINITIS, UNSPECIFIED SEASONALITY, UNSPECIFIED TRIGGER: ICD-10-CM

## 2022-09-08 DIAGNOSIS — J34.89 NASAL OBSTRUCTION: Primary | ICD-10-CM

## 2022-09-08 PROCEDURE — 99024 POSTOP FOLLOW-UP VISIT: CPT | Performed by: STUDENT IN AN ORGANIZED HEALTH CARE EDUCATION/TRAINING PROGRAM

## 2022-09-08 ASSESSMENT — ENCOUNTER SYMPTOMS
ABDOMINAL PAIN: 0
COUGH: 0
EYE DISCHARGE: 0

## 2022-09-08 NOTE — PROGRESS NOTES
[place] ENT Office Note    Patient: Nirav Ricks  MRN: [id number]  : 1970  Gender:  male  Vital Signs: There were no vitals taken for this visit. Date: 2022    CC:   Chief Complaint   Patient presents with    Post-Op Check     Patient here for post -op check. He states he is doing fine. HPI:  Nirav Ricks is a 47 y.o. male status post septoplasty, turbinate reduction, and vivaer on 2022. He is doing well. He is ready for splints to come out. Past Medical History, Past Surgical History, Family history, Social History, and Medications were all reviewed with the patient today and updated as necessary. No Known Allergies  Patient Active Problem List   Diagnosis    Latent hyperopia of both eyes    Renal mass    H/O right nephrectomy    Hyperlipidemia    HTN (hypertension)    Renal cancer, right (HCC)    Cortical cataract of both eyes    Presbyopia of both eyes    Stage 3a chronic kidney disease ([address]    Second hand tobacco smoke exposure    Nasal obstruction    Deviated nasal septum    Hypertrophy of both inferior nasal turbinates    Nasal valve collapse     Current Outpatient Medications   Medication Sig    oxyCODONE-acetaminophen (PERCOCET) 5-325 MG per tablet Take 1 tablet by mouth every 6 hours as needed for Pain for up to 6 days. Intended supply: 3 days. Take lowest dose possible to manage pain    ondansetron (ZOFRAN) 4 MG tablet Take 1 tablet by mouth 3 times daily as needed for Nausea or Vomiting    amoxicillin-clavulanate (AUGMENTIN) 875-125 MG per tablet Take 1 tablet by mouth 2 times daily for 7 days    ramipril (ALTACE) 5 MG capsule Take 1 capsule by mouth in the morning and at bedtime    rosuvastatin (CRESTOR) 40 MG tablet Take 1 tablet by mouth every evening     No current facility-administered medications for this visit.      Past Medical History:   Diagnosis Date    History of kidney cancer     HTN (hypertension) 2012    Hyperlipidemia 2012    Kidney stones      Social History     Tobacco Use    Smoking status: Never    Smokeless tobacco: Never   Substance Use Topics    Alcohol use: Yes     Past Surgical History:   Procedure Laterality Date    KIDNEY REMOVAL Right     NOSE SURGERY Bilateral 9/2/2022    Radiofrequency Ablation to Nasal  Sidewall with Essie Jasmine for Nasal Valve Collapse performed by Ang Haley MD at [facility]    SINUS ENDOSCOPY Bilateral 9/2/2022    SINUS ENDOSCOPY FUNCTIONAL SURGERY Septoplasty, Bilateral Submucous Resection Inferior Turbinates performed by Ang Haley MD at [facility]       Family History   Problem Relation Age of Onset    Heart Disease Brother         Stent in LAD    Lung Disease Father         lung disease    High Cholesterol Mother     Heart Disease Mother     Liver Disease Mother     Diabetes Mother     Hypertension Mother         ROS:    Review of Systems   Constitutional:  Negative for fever. HENT:  Negative for ear discharge and ear pain. Eyes:  Negative for discharge. Respiratory:  Negative for cough. Gastrointestinal:  Negative for abdominal pain. Genitourinary:  Negative for difficulty urinating. Musculoskeletal:  Negative for arthralgias and neck pain. Skin:  Negative for rash. Allergic/Immunologic: Negative for environmental allergies. Neurological:  Negative for dizziness. Hematological:  Negative for adenopathy. Psychiatric/Behavioral:  Negative for agitation. PHYSICAL EXAM:    There were no vitals taken for this visit. General: NAD, well-appearing  Neuro: No gross neuro deficits. CN's II-XII intact. No facial weakness. Eyes: EOMI. Pupils reactive. No periorbital edema/ecchymosis. Skin: No facial erythema, rashes or concerning lesions. Nose: No external deviations or saddling. Intranasally, Mcgarry splints removed, mucosa healing appropriately  Mouth: Moist mucus membranes, normal tongue/palate mobility, no concerning mucosal lesions.    Oropharynx:

## 2022-12-07 ENCOUNTER — OFFICE VISIT (OUTPATIENT)
Dept: ENT CLINIC | Age: 52
End: 2022-12-07
Payer: COMMERCIAL

## 2022-12-07 VITALS
SYSTOLIC BLOOD PRESSURE: 110 MMHG | WEIGHT: 210.6 LBS | DIASTOLIC BLOOD PRESSURE: 82 MMHG | HEIGHT: 66 IN | BODY MASS INDEX: 33.85 KG/M2

## 2022-12-07 DIAGNOSIS — J34.89 NASAL OBSTRUCTION: Primary | ICD-10-CM

## 2022-12-07 DIAGNOSIS — J34.2 NASAL SEPTAL DEVIATION: ICD-10-CM

## 2022-12-07 DIAGNOSIS — J30.9 ALLERGIC RHINITIS, UNSPECIFIED SEASONALITY, UNSPECIFIED TRIGGER: ICD-10-CM

## 2022-12-07 PROCEDURE — 99213 OFFICE O/P EST LOW 20 MIN: CPT | Performed by: STUDENT IN AN ORGANIZED HEALTH CARE EDUCATION/TRAINING PROGRAM

## 2022-12-07 PROCEDURE — 3078F DIAST BP <80 MM HG: CPT | Performed by: STUDENT IN AN ORGANIZED HEALTH CARE EDUCATION/TRAINING PROGRAM

## 2022-12-07 PROCEDURE — 3074F SYST BP LT 130 MM HG: CPT | Performed by: STUDENT IN AN ORGANIZED HEALTH CARE EDUCATION/TRAINING PROGRAM

## 2022-12-07 ASSESSMENT — ENCOUNTER SYMPTOMS
EYE DISCHARGE: 0
ABDOMINAL PAIN: 0
COUGH: 0

## 2022-12-07 NOTE — PROGRESS NOTES
[place] [facility] Office Note    Patient: Karla Morrison  MRN: [id number]  : 1970  Gender:  male  Vital Signs: /82 (Site: Left Upper Arm, Position: Sitting)   Ht 5' 6\" (1.676 m)   Wt 210 lb 9.6 oz (95.5 kg)   BMI 33.99 kg/m²   Date: 2022    CC:   Chief Complaint   Patient presents with    Follow-up     Patient here for his 3 month follow-up. He  reports he is doing good. HPI:  Karla Morrison is a 47 y.o. male status post septoplasty, turbinate reduction, and vivaer on 2022. He is breathing well through his nose. No other issues. Past Medical History, Past Surgical History, Family history, Social History, and Medications were all reviewed with the patient today and updated as necessary. No Known Allergies  Patient Active Problem List   Diagnosis    Latent hyperopia of both eyes    Renal mass    H/O right nephrectomy    Hyperlipidemia    HTN (hypertension)    Renal cancer, right (HCC)    Cortical cataract of both eyes    Presbyopia of both eyes    Stage 3a chronic kidney disease ([address]    Second hand tobacco smoke exposure    Nasal obstruction    Deviated nasal septum    Hypertrophy of both inferior nasal turbinates    Nasal valve collapse     Current Outpatient Medications   Medication Sig    ondansetron (ZOFRAN) 4 MG tablet Take 1 tablet by mouth 3 times daily as needed for Nausea or Vomiting    ramipril (ALTACE) 5 MG capsule Take 1 capsule by mouth in the morning and at bedtime    rosuvastatin (CRESTOR) 40 MG tablet Take 1 tablet by mouth every evening     No current facility-administered medications for this visit.      Past Medical History:   Diagnosis Date    History of kidney cancer     HTN (hypertension) 2012    Hyperlipidemia 2012    Kidney stones      Social History     Tobacco Use    Smoking status: Never    Smokeless tobacco: Never   Substance Use Topics    Alcohol use: Yes     Past Surgical History:   Procedure Laterality Date    KIDNEY REMOVAL Right NOSE SURGERY Bilateral 9/2/2022    Radiofrequency Ablation to Nasal  Sidewall with Alma [place] for Nasal Valve Collapse performed by Rodolfo Mera MD at [address] ENDOSCOPY Bilateral 9/2/2022    SINUS ENDOSCOPY FUNCTIONAL SURGERY Septoplasty, Bilateral Submucous Resection Inferior Turbinates performed by Rodolfo Mera MD at [address] [facility]       Family History   Problem Relation Age of Onset    Heart Disease Brother         Stent in LAD    Lung Disease Father         lung disease    High Cholesterol Mother     Heart Disease Mother     Liver Disease Mother     Diabetes Mother     Hypertension Mother         ROS:    Review of Systems   Constitutional:  Negative for fever. HENT:  Negative for ear discharge and ear pain. Eyes:  Negative for discharge. Respiratory:  Negative for cough. Cardiovascular:  Negative for chest pain. Gastrointestinal:  Negative for abdominal pain. Genitourinary:  Negative for difficulty urinating. Musculoskeletal:  Negative for neck pain. Skin:  Negative for rash. Allergic/Immunologic: Negative for environmental allergies. Neurological:  Negative for dizziness. Hematological:  Negative for adenopathy. Psychiatric/Behavioral:  Negative for agitation. PHYSICAL EXAM:    /82 (Site: Left Upper Arm, Position: Sitting)   Ht 5' 6\" (1.676 m)   Wt 210 lb 9.6 oz (95.5 kg)   BMI 33.99 kg/m²     General: NAD, well-appearing  Neuro: No gross neuro deficits. CN's II-XII intact. No facial weakness. Eyes: EOMI. Pupils reactive. No periorbital edema/ecchymosis. Skin: No facial erythema, rashes or concerning lesions. Nose: No external deviations or saddling. Intranasally, septum is predominantly midline although there is a slight caudal septal deviation to the left, mucosa healthy  Mouth: Moist mucus membranes, normal tongue/palate mobility, no concerning mucosal lesions.    Oropharynx: clear with no erythema/exudate, no tonsillar hypertrophy. Ears: Normal appearing auricles, no hematomas. EACs clear with no cerumen impaction, healthy canal skin, TM's intact with no perforations or retraction pockets. No middle ear effusions. Neck: Soft, supple, no palpable lateral neck masses. No parotid or submandibular masses. No thyromegaly or palpable thyroid nodules. No surgical scars. Lymphatics: No palpable cervical LAD. Resp/Lungs: No audible stridor or wheezing, CTAB  Heart: RRR  Extremities: No clubbing or cyanosis. ASSESSMENT and PLAN  52yM status post septoplasty, turbinate reduction, and vivaer on 9-2-2022. He is doing well. He is breathing well through his nose. He can follow back up as needed. ICD-10-CM    1. Nasal obstruction  J34.89       2. Allergic rhinitis, unspecified seasonality, unspecified trigger  J30.9       3. Nasal septal deviation  J34.2             Isa Sanders MD  12/7/2022  Electronically signed    Note dictated using voice recognition software. Please excuse any typos.

## 2022-12-08 PROBLEM — C64.1 RENAL CANCER, RIGHT (HCC): Status: ACTIVE | Noted: 2021-10-02

## 2022-12-08 ASSESSMENT — ENCOUNTER SYMPTOMS
BACK PAIN: 0
SINUS PRESSURE: 0
SINUS PAIN: 0
DIARRHEA: 0
COLOR CHANGE: 0
VOMITING: 0
NAUSEA: 0
CONSTIPATION: 0
SHORTNESS OF BREATH: 0
ABDOMINAL PAIN: 0

## 2022-12-08 NOTE — PROGRESS NOTES
HISTORY OF PRESENT ILLNESS  Chief Complaint   Patient presents with    Follow-up       Previously said, \" ENT; continous sinus issues; Right nostril gotten smaller. Seems to be snoring more. Normally waking up 2-3 times a night  Has to sleep with hand under chin so it improves airflow    spot on leg STILL not healed!! Everything popping and hurting and getting older. Pain on the right side of stomach - has an appt in 6/16/22. Intermittent - from working out. \"    Previously said, \"  place on leg that wont heal   Has had it for 2 years - goes over it and then breaks open  Joint pain knees r>L when he runs. Elbows hurting for awhile from pushups and pull ups. Sore to touch on the medial elbows. Tingling in the fingers when he drives. history of carpal tunnel. \"    Hypertension  This is a chronic problem. The current episode started more than 1 year ago. The problem is unchanged. The problem is controlled. Pertinent negatives include no anxiety, blurred vision, chest pain, headaches, malaise/fatigue, neck pain, orthopnea, palpitations, peripheral edema, PND, shortness of breath or sweats. There are no associated agents to hypertension. Risk factors for coronary artery disease include dyslipidemia, family history, male gender and smoking/tobacco exposure. Review of Systems   Constitutional:  Negative for chills, fatigue, fever and malaise/fatigue. HENT:  Negative for congestion, sinus pressure and sinus pain. Eyes:  Negative for blurred vision. Respiratory:  Negative for shortness of breath. Cardiovascular:  Negative for chest pain, palpitations, orthopnea and PND. Gastrointestinal:  Negative for abdominal pain, constipation, diarrhea, nausea and vomiting. Genitourinary:  Negative for difficulty urinating, dysuria, frequency and urgency. Musculoskeletal:  Negative for arthralgias, back pain and neck pain. Skin:  Negative for color change and pallor. Allergic/Immunologic: Negative for environmental allergies. Neurological:  Negative for headaches. Psychiatric/Behavioral:  The patient is not nervous/anxious. Past Medical History:   Diagnosis Date    History of kidney cancer     HTN (hypertension) 2012    Hyperlipidemia 2012    Kidney stones     Renal cancer, right ([address] 10/2/2021    Interpretation: Immunohistochemical findings consistent with Xp11  translocation renal cell carcinoma.    Antibody/Test Marker For Result  GF88 Follicle center B lymphocytes  Positive  TFE3 Translocation renal cell carcinoma  Positive      Past Surgical History:   Procedure Laterality Date    KIDNEY REMOVAL Right     NOSE SURGERY Bilateral 2022    Radiofrequency Ablation to Nasal  Sidewall with [facility] for Nasal Valve Collapse performed by Pancho Hammer MD at [facility]    SINUS ENDOSCOPY Bilateral 2022    SINUS ENDOSCOPY FUNCTIONAL SURGERY Septoplasty, Bilateral Submucous Resection Inferior Turbinates performed by Pancho Hammer MD at [address] [place] [address]       Family History   Problem Relation Age of Onset    Heart Disease Brother         Stent in LAD    Lung Disease Father         lung disease    High Cholesterol Mother     Heart Disease Mother     Liver Disease Mother     Diabetes Mother     Hypertension Mother      Family Status   Relation Name Status    Brother  (Not Specified)    Father  Alive    Mother   at age 79        liver failure ( due to meds son thinks)      Social History     Socioeconomic History    Marital status:      Spouse name: Not on file    Number of children: Not on file    Years of education: Not on file    Highest education level: Not on file   Occupational History    Not on file   Tobacco Use    Smoking status: Never    Smokeless tobacco: Never   Vaping Use    Vaping Use: Never used   Substance and Sexual Activity    Alcohol use: Yes    Drug use: No    Sexual activity: Not on file   Other Topics Concern    Not on file   Social History Narrative    Not on file     Social Determinants of Health     Financial Resource Strain: Not on file   Food Insecurity: Not on file   Transportation Needs: Not on file   Physical Activity: Not on file   Stress: Not on file   Social Connections: Not on file   Intimate Partner Violence: Not on file   Housing Stability: Not on file       No Known Allergies  Current Outpatient Medications   Medication Sig    rosuvastatin (CRESTOR) 40 MG tablet Take 1 tablet by mouth every evening    ramipril (ALTACE) 5 MG capsule Take 1 capsule by mouth in the morning and at bedtime    ondansetron (ZOFRAN) 4 MG tablet Take 1 tablet by mouth 3 times daily as needed for Nausea or Vomiting     No current facility-administered medications for this visit. Patient Active Problem List   Diagnosis    Latent hyperopia of both eyes    H/O right nephrectomy    Hyperlipidemia    HTN (hypertension)    Renal cancer, right (HCC)    Cortical cataract of both eyes    Presbyopia of both eyes    Stage 3a chronic kidney disease ([facility]    Second hand tobacco smoke exposure    Nasal obstruction    Deviated nasal septum    Hypertrophy of both inferior nasal turbinates    Nasal valve collapse        Blood pressure 125/83, pulse 55, temperature 97 °F (36.1 °C), temperature source Temporal, resp. rate 16, height 5' 6\" (1.676 m), weight 211 lb (95.7 kg), SpO2 96 %. Pain Scale: 0 - No pain/10    0 - No pain    Body mass index is 34.06 kg/m². Physical Exam  Vitals and nursing note reviewed. Constitutional:       General: He is not in acute distress. Appearance: He is normal weight. He is not toxic-appearing. HENT:      Head: Normocephalic. Eyes:      General: Lids are normal.      Extraocular Movements: Extraocular movements intact. Conjunctiva/sclera: Conjunctivae normal.      Pupils: Pupils are equal.   Cardiovascular:      Rate and Rhythm: Normal rate and regular rhythm.       Heart sounds: Normal heart sounds. No murmur heard. No friction rub. No gallop. Pulmonary:      Effort: Pulmonary effort is normal. No respiratory distress. Breath sounds: Normal breath sounds. No stridor. No wheezing, rhonchi or rales. Chest:      Chest wall: No tenderness. Skin:     General: Skin is warm and dry. Capillary Refill: Capillary refill takes less than 2 seconds. Neurological:      General: No focal deficit present. Mental Status: He is alert and oriented to person, place, and time. Psychiatric:         Mood and Affect: Mood normal.         Behavior: Behavior normal.         Thought Content: Thought content normal.         Judgment: Judgment normal.            ASSESSMENT and PLAN   Diagnosis Orders   1. Primary hypertension  ramipril (ALTACE) 5 MG capsule    Basic Metabolic Panel    Microalbumin / Creatinine Urine Ratio      2. Mixed hyperlipidemia  rosuvastatin (CRESTOR) 40 MG tablet    Lipid Panel      3. Renal cancer, right ([address]  Basic Metabolic Panel    Microalbumin / Creatinine Urine Ratio    XR CHEST (2 VW)      4. Need for hepatitis C screening test  Hepatitis C Ab, Rflx to Qt by PCR          Reviewed medications and side effects in detail    UpToDate was consulted for the most current recommendations. \"? Chest imaging - Chest radiograph or CT annually for three years, then as clinically indicated. ? Central nervous system (CNS) imaging, pelvic imaging, and bone imaging as clinically indicated. \"    The patient's condition was discussed at length with the patient. The expected course and possible complications were reviewed. All questions were answered. His other chronic conditions are stable at this time. He is stable on the current treatment and tolerating it well. No significant sides effects. Will not adjust therapy at this time, unless noted above. We will continue to monitor for any problems. Medications refilled and lab work has been ordered where needed.   Reviewed medications are explained including any potential interactions or side effects in detail. The patient's questions were answered. He  understands the above and has no further questions. Further workup and treatment should be done if symptoms persist, worsen or new symptoms occur. He  will call to notify us of any problems, complications or worsening symptoms. Follow-up and Dispositions    Return in about 6 months (around 6/9/2023) for labs today. There are no Patient Instructions on file for this visit. (Some details in this note may have been created with speech-recognition software. Some errors in speech recognition may have occurred.    Most of those have been corrected but some may have been missed.)         Robert Faye MD  [address] [facility] [address]  Phone [phone]

## 2022-12-09 ENCOUNTER — NURSE ONLY (OUTPATIENT)
Dept: FAMILY MEDICINE CLINIC | Facility: CLINIC | Age: 52
End: 2022-12-09

## 2022-12-09 ENCOUNTER — OFFICE VISIT (OUTPATIENT)
Dept: FAMILY MEDICINE CLINIC | Facility: CLINIC | Age: 52
End: 2022-12-09
Payer: COMMERCIAL

## 2022-12-09 VITALS
OXYGEN SATURATION: 96 % | HEIGHT: 66 IN | WEIGHT: 211 LBS | DIASTOLIC BLOOD PRESSURE: 83 MMHG | SYSTOLIC BLOOD PRESSURE: 125 MMHG | RESPIRATION RATE: 16 BRPM | BODY MASS INDEX: 33.91 KG/M2 | HEART RATE: 55 BPM | TEMPERATURE: 97 F

## 2022-12-09 DIAGNOSIS — I10 PRIMARY HYPERTENSION: ICD-10-CM

## 2022-12-09 DIAGNOSIS — I10 PRIMARY HYPERTENSION: Primary | ICD-10-CM

## 2022-12-09 DIAGNOSIS — C64.1 RENAL CANCER, RIGHT (HCC): ICD-10-CM

## 2022-12-09 DIAGNOSIS — E78.2 MIXED HYPERLIPIDEMIA: ICD-10-CM

## 2022-12-09 DIAGNOSIS — Z11.59 NEED FOR HEPATITIS C SCREENING TEST: ICD-10-CM

## 2022-12-09 PROBLEM — N28.89 RENAL MASS: Status: RESOLVED | Noted: 2019-12-11 | Resolved: 2022-12-09

## 2022-12-09 LAB
ANION GAP SERPL CALC-SCNC: 6 MMOL/L (ref 2–11)
BUN SERPL-MCNC: 21 MG/DL (ref 6–23)
CALCIUM SERPL-MCNC: 9.4 MG/DL (ref 8.3–10.4)
CHLORIDE SERPL-SCNC: 109 MMOL/L (ref 101–110)
CHOLEST SERPL-MCNC: 200 MG/DL
CO2 SERPL-SCNC: 27 MMOL/L (ref 21–32)
CREAT SERPL-MCNC: 1.5 MG/DL (ref 0.8–1.5)
GLUCOSE SERPL-MCNC: 106 MG/DL (ref 65–100)
HDLC SERPL-MCNC: 35 MG/DL (ref 40–60)
HDLC SERPL: 5.7
LDLC SERPL CALC-MCNC: 111.4 MG/DL
POTASSIUM SERPL-SCNC: 4.3 MMOL/L (ref 3.5–5.1)
SODIUM SERPL-SCNC: 142 MMOL/L (ref 133–143)
TRIGL SERPL-MCNC: 268 MG/DL (ref 35–150)
VLDLC SERPL CALC-MCNC: 53.6 MG/DL (ref 6–23)

## 2022-12-09 PROCEDURE — 3074F SYST BP LT 130 MM HG: CPT | Performed by: FAMILY MEDICINE

## 2022-12-09 PROCEDURE — 3078F DIAST BP <80 MM HG: CPT | Performed by: FAMILY MEDICINE

## 2022-12-09 PROCEDURE — 99214 OFFICE O/P EST MOD 30 MIN: CPT | Performed by: FAMILY MEDICINE

## 2022-12-09 RX ORDER — RAMIPRIL 5 MG/1
5 CAPSULE ORAL 2 TIMES DAILY
Qty: 180 CAPSULE | Refills: 1 | Status: SHIPPED | OUTPATIENT
Start: 2022-12-09

## 2022-12-09 RX ORDER — ROSUVASTATIN CALCIUM 40 MG/1
40 TABLET, COATED ORAL EVERY EVENING
Qty: 180 TABLET | Refills: 1 | Status: SHIPPED | OUTPATIENT
Start: 2022-12-09

## 2022-12-09 ASSESSMENT — PATIENT HEALTH QUESTIONNAIRE - PHQ9
SUM OF ALL RESPONSES TO PHQ QUESTIONS 1-9: 0
SUM OF ALL RESPONSES TO PHQ9 QUESTIONS 1 & 2: 0
2. FEELING DOWN, DEPRESSED OR HOPELESS: 0
SUM OF ALL RESPONSES TO PHQ QUESTIONS 1-9: 0
1. LITTLE INTEREST OR PLEASURE IN DOING THINGS: 0
SUM OF ALL RESPONSES TO PHQ QUESTIONS 1-9: 0
SUM OF ALL RESPONSES TO PHQ QUESTIONS 1-9: 0

## 2022-12-09 ASSESSMENT — ENCOUNTER SYMPTOMS
BLURRED VISION: 0
ORTHOPNEA: 0

## 2022-12-11 LAB
HCV AB S/CO SERPL IA: <0.1 S/CO RATIO (ref 0–0.9)
HCV AB SERPL QL IA: NORMAL

## 2022-12-12 DIAGNOSIS — C64.1 RENAL CANCER, RIGHT (HCC): ICD-10-CM

## 2023-06-02 ENCOUNTER — OFFICE VISIT (OUTPATIENT)
Dept: FAMILY MEDICINE CLINIC | Facility: CLINIC | Age: 53
End: 2023-06-02
Payer: COMMERCIAL

## 2023-06-02 VITALS
OXYGEN SATURATION: 98 % | WEIGHT: 210 LBS | BODY MASS INDEX: 33.75 KG/M2 | RESPIRATION RATE: 18 BRPM | DIASTOLIC BLOOD PRESSURE: 88 MMHG | SYSTOLIC BLOOD PRESSURE: 132 MMHG | HEIGHT: 66 IN | TEMPERATURE: 97.6 F | HEART RATE: 60 BPM

## 2023-06-02 DIAGNOSIS — R73.03 PREDIABETES: ICD-10-CM

## 2023-06-02 DIAGNOSIS — C64.1 RENAL CANCER, RIGHT (HCC): ICD-10-CM

## 2023-06-02 DIAGNOSIS — Z23 ENCOUNTER FOR IMMUNIZATION: ICD-10-CM

## 2023-06-02 DIAGNOSIS — I10 PRIMARY HYPERTENSION: Primary | ICD-10-CM

## 2023-06-02 DIAGNOSIS — Z12.11 SCREENING FOR MALIGNANT NEOPLASM OF COLON: ICD-10-CM

## 2023-06-02 DIAGNOSIS — E78.2 MIXED HYPERLIPIDEMIA: ICD-10-CM

## 2023-06-02 LAB
ALBUMIN SERPL-MCNC: 4 G/DL (ref 3.5–5)
ALBUMIN/GLOB SERPL: 1.3 (ref 0.4–1.6)
ALP SERPL-CCNC: 75 U/L (ref 50–136)
ALT SERPL-CCNC: 54 U/L (ref 12–65)
ANION GAP SERPL CALC-SCNC: 3 MMOL/L (ref 2–11)
AST SERPL-CCNC: 43 U/L (ref 15–37)
BASOPHILS # BLD: 0 K/UL (ref 0–0.2)
BASOPHILS NFR BLD: 1 % (ref 0–2)
BILIRUB SERPL-MCNC: 1 MG/DL (ref 0.2–1.1)
BUN SERPL-MCNC: 22 MG/DL (ref 6–23)
CALCIUM SERPL-MCNC: 9.1 MG/DL (ref 8.3–10.4)
CHLORIDE SERPL-SCNC: 108 MMOL/L (ref 101–110)
CHOLEST SERPL-MCNC: 167 MG/DL
CO2 SERPL-SCNC: 28 MMOL/L (ref 21–32)
CREAT SERPL-MCNC: 1.4 MG/DL (ref 0.8–1.5)
DIFFERENTIAL METHOD BLD: NORMAL
EOSINOPHIL # BLD: 0.2 K/UL (ref 0–0.8)
EOSINOPHIL NFR BLD: 3 % (ref 0.5–7.8)
ERYTHROCYTE [DISTWIDTH] IN BLOOD BY AUTOMATED COUNT: 13.5 % (ref 11.9–14.6)
EST. AVERAGE GLUCOSE BLD GHB EST-MCNC: 120 MG/DL
GLOBULIN SER CALC-MCNC: 3.1 G/DL (ref 2.8–4.5)
GLUCOSE SERPL-MCNC: 108 MG/DL (ref 65–100)
HBA1C MFR BLD: 5.8 % (ref 4.8–5.6)
HCT VFR BLD AUTO: 44.6 % (ref 41.1–50.3)
HDLC SERPL-MCNC: 44 MG/DL (ref 40–60)
HDLC SERPL: 3.8
HGB BLD-MCNC: 14.8 G/DL (ref 13.6–17.2)
IMM GRANULOCYTES # BLD AUTO: 0 K/UL (ref 0–0.5)
IMM GRANULOCYTES NFR BLD AUTO: 0 % (ref 0–5)
LDLC SERPL CALC-MCNC: 82 MG/DL
LYMPHOCYTES # BLD: 1.9 K/UL (ref 0.5–4.6)
LYMPHOCYTES NFR BLD: 28 % (ref 13–44)
MCH RBC QN AUTO: 31.9 PG (ref 26.1–32.9)
MCHC RBC AUTO-ENTMCNC: 33.2 G/DL (ref 31.4–35)
MCV RBC AUTO: 96.1 FL (ref 82–102)
MONOCYTES # BLD: 0.7 K/UL (ref 0.1–1.3)
MONOCYTES NFR BLD: 10 % (ref 4–12)
NEUTS SEG # BLD: 4 K/UL (ref 1.7–8.2)
NEUTS SEG NFR BLD: 58 % (ref 43–78)
NRBC # BLD: 0 K/UL (ref 0–0.2)
PLATELET # BLD AUTO: 221 K/UL (ref 150–450)
PMV BLD AUTO: 10 FL (ref 9.4–12.3)
POTASSIUM SERPL-SCNC: 4.4 MMOL/L (ref 3.5–5.1)
PROT SERPL-MCNC: 7.1 G/DL (ref 6.3–8.2)
RBC # BLD AUTO: 4.64 M/UL (ref 4.23–5.6)
SODIUM SERPL-SCNC: 139 MMOL/L (ref 133–143)
TRIGL SERPL-MCNC: 205 MG/DL (ref 35–150)
VLDLC SERPL CALC-MCNC: 41 MG/DL (ref 6–23)
WBC # BLD AUTO: 6.8 K/UL (ref 4.3–11.1)

## 2023-06-02 PROCEDURE — 3079F DIAST BP 80-89 MM HG: CPT | Performed by: PHYSICIAN ASSISTANT

## 2023-06-02 PROCEDURE — 99214 OFFICE O/P EST MOD 30 MIN: CPT | Performed by: PHYSICIAN ASSISTANT

## 2023-06-02 PROCEDURE — 3075F SYST BP GE 130 - 139MM HG: CPT | Performed by: PHYSICIAN ASSISTANT

## 2023-06-02 RX ORDER — RAMIPRIL 5 MG/1
5 CAPSULE ORAL 2 TIMES DAILY
Qty: 180 CAPSULE | Refills: 1 | Status: SHIPPED | OUTPATIENT
Start: 2023-06-02

## 2023-06-02 RX ORDER — ROSUVASTATIN CALCIUM 40 MG/1
40 TABLET, COATED ORAL EVERY EVENING
Qty: 90 TABLET | Refills: 1 | Status: SHIPPED | OUTPATIENT
Start: 2023-06-02

## 2023-06-02 RX ORDER — ZOSTER VACCINE RECOMBINANT, ADJUVANTED 50 MCG/0.5
0.5 KIT INTRAMUSCULAR SEE ADMIN INSTRUCTIONS
Qty: 0.5 ML | Refills: 0 | Status: SHIPPED | OUTPATIENT
Start: 2023-06-02 | End: 2023-11-29

## 2023-06-02 SDOH — ECONOMIC STABILITY: FOOD INSECURITY: WITHIN THE PAST 12 MONTHS, YOU WORRIED THAT YOUR FOOD WOULD RUN OUT BEFORE YOU GOT MONEY TO BUY MORE.: NEVER TRUE

## 2023-06-02 SDOH — ECONOMIC STABILITY: HOUSING INSECURITY
IN THE LAST 12 MONTHS, WAS THERE A TIME WHEN YOU DID NOT HAVE A STEADY PLACE TO SLEEP OR SLEPT IN A SHELTER (INCLUDING NOW)?: NO

## 2023-06-02 SDOH — ECONOMIC STABILITY: FOOD INSECURITY: WITHIN THE PAST 12 MONTHS, THE FOOD YOU BOUGHT JUST DIDN'T LAST AND YOU DIDN'T HAVE MONEY TO GET MORE.: NEVER TRUE

## 2023-06-02 SDOH — ECONOMIC STABILITY: INCOME INSECURITY: HOW HARD IS IT FOR YOU TO PAY FOR THE VERY BASICS LIKE FOOD, HOUSING, MEDICAL CARE, AND HEATING?: NOT HARD AT ALL

## 2023-06-02 ASSESSMENT — PATIENT HEALTH QUESTIONNAIRE - PHQ9
SUM OF ALL RESPONSES TO PHQ QUESTIONS 1-9: 0
1. LITTLE INTEREST OR PLEASURE IN DOING THINGS: 0
SUM OF ALL RESPONSES TO PHQ QUESTIONS 1-9: 0
SUM OF ALL RESPONSES TO PHQ QUESTIONS 1-9: 0
SUM OF ALL RESPONSES TO PHQ9 QUESTIONS 1 & 2: 0
SUM OF ALL RESPONSES TO PHQ QUESTIONS 1-9: 0
2. FEELING DOWN, DEPRESSED OR HOPELESS: 0

## 2023-06-02 NOTE — PROGRESS NOTES
[facility]  [address]  [address]  Phone [phone]      Patient: Charlotte Calvillo  YOB: 1970  Age 47 y.o. Sex male  Medical Record:  [id number]  Visit Date: 06/02/23  Author:  Preethi Naqvi PA-C    Family Practice Clinic Note    Chief Complaint   Patient presents with    6 Month Follow-Up    Hypertension    Hyperlipidemia       History of Present Illness  This is a 61-year-old male who returns today for 6-month follow-up. He has a history of right renal cancer status post nephrectomy. He continues to follow with urology routinely. HTN  This is a chronic issue for the patient. Reports compliance with ramipril 5 mg twice daily. Denies problems or side effects on the medication. Checks his blood pressure periodically at home. Notes readings are consistently less than 140/90. Denies chest pain, shortness breath, palpitations or lower extremity edema. Hyperlipidemia  This is a chronic issue for the patient. Reports compliance with rosuvastatin 40 mg daily. Denies any problems or side effects on the medication. No myalgias. Not following any specific diet in regards to this issue. Prediabetes  This is a chronic issue for the patient. No pharmacologic management at this point. Watching his diet. Past History:    Past Medical history   Past Medical History:   Diagnosis Date    History of kidney cancer     HTN (hypertension) 11/17/2012    Hyperlipidemia 11/17/2012    Kidney stones     Prediabetes     Renal cancer, right ([facility] 10/02/2021    Interpretation: Immunohistochemical findings consistent with Xp11  translocation renal cell carcinoma.    Antibody/Test Marker For Result  QO30 Follicle center B lymphocytes  Positive  TFE3 Translocation renal cell carcinoma  Positive        Current Problem List:   Patient Active Problem List   Diagnosis    Latent hyperopia of both eyes    H/O right nephrectomy    Hyperlipidemia    HTN (hypertension)    Renal

## 2023-06-02 NOTE — PATIENT INSTRUCTIONS
*Pre-diabetes or Impaired glucose tolerance (IGT) is a precursor to diabetes. Diabetes (DM) is a tough disease to have so it is wise to avoid or delay this disease as much as possible. DM ages people much faster and greatly increases the risk for heart disease, stroke, kidney failure with dialysis and blindness (among other problems). IGT can be corrected or dramatically improved with weight loss, aerobic exercise and diet modification (avoiding carbohydrates). *Please keep track of your blood pressure. Check it 3-4 days a week. Write down your results and bring them with you to your next office visit for review. We'd like you to stay < 140/90, and ideally < 130/80. Let us know if you find yourself above this routinely.

## 2023-07-07 ENCOUNTER — HOSPITAL ENCOUNTER (OUTPATIENT)
Dept: ULTRASOUND IMAGING | Age: 53
Discharge: HOME | End: 2023-07-07
Payer: COMMERCIAL

## 2023-07-07 DIAGNOSIS — C64.1 RENAL CANCER, RIGHT (HCC): ICD-10-CM

## 2023-07-07 PROCEDURE — 76770 US EXAM ABDO BACK WALL COMP: CPT

## 2023-07-18 ENCOUNTER — OFFICE VISIT (OUTPATIENT)
Dept: UROLOGY | Age: 53
End: 2023-07-18
Payer: COMMERCIAL

## 2023-07-18 DIAGNOSIS — Z85.528 HISTORY OF RENAL CELL CANCER: Primary | ICD-10-CM

## 2023-07-18 PROCEDURE — 99214 OFFICE O/P EST MOD 30 MIN: CPT | Performed by: UROLOGY

## 2023-07-18 NOTE — PROGRESS NOTES
[facility]  [address] [address]  [phone]    [place] Failing  : 1970     HPI   47 y.o., male returns in follow up for RCC. S/P R HALN on 19. 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 No new complaints. He elected not to visit with Dr. Petrona Joshua. UMER on 23 was neg. Last Cr was 1.4 on 23. Past Medical History:   Diagnosis Date    History of kidney cancer     HTN (hypertension) 2012    Hyperlipidemia 2012    Kidney stones     Prediabetes     Renal cancer, right ([address]) 10/02/2021    Interpretation: Immunohistochemical findings consistent with Xp11  translocation renal cell carcinoma. Antibody/Test Marker For Result  QI12 Follicle center B lymphocytes  Positive  TFE3 Translocation renal cell carcinoma  Positive      Past Surgical History:   Procedure Laterality Date    KIDNEY REMOVAL Right     NOSE SURGERY Bilateral 2022    Radiofrequency Ablation to Nasal  Sidewall with [facility] for Nasal Valve Collapse performed by Tere Horne MD at [address] [place] [address] ENDOSCOPY Bilateral 2022    SINUS ENDOSCOPY FUNCTIONAL SURGERY Septoplasty, Bilateral Submucous Resection Inferior Turbinates performed by Tere Horne MD at [facility]       Current Outpatient Medications   Medication Sig Dispense Refill    rosuvastatin (CRESTOR) 40 MG tablet Take 1 tablet by mouth every evening 90 tablet 1    ramipril (ALTACE) 5 MG capsule Take 1 capsule by mouth in the morning and at bedtime 180 capsule 1    zoster recombinant adjuvanted vaccine [facility]) 50 MCG/0.5ML SUSR injection Inject 0.5 mLs into the muscle See Admin Instructions 1 dose now and repeat in 2-6 months 0.5 mL 0     No current facility-administered medications for this visit.      No Known Allergies  Social History     Socioeconomic History    Marital status:      Spouse name: Not on file    Number of children: Not on file    Years of

## 2024-02-13 RX ORDER — RAMIPRIL 5 MG/1
5 CAPSULE ORAL 2 TIMES DAILY
Qty: 180 CAPSULE | Refills: 1 | Status: CANCELLED | OUTPATIENT
Start: 2024-02-13

## 2024-02-14 ENCOUNTER — OFFICE VISIT (OUTPATIENT)
Dept: FAMILY MEDICINE CLINIC | Facility: CLINIC | Age: 54
End: 2024-02-14
Payer: COMMERCIAL

## 2024-02-14 VITALS
HEART RATE: 61 BPM | HEIGHT: 66 IN | RESPIRATION RATE: 16 BRPM | DIASTOLIC BLOOD PRESSURE: 88 MMHG | SYSTOLIC BLOOD PRESSURE: 136 MMHG | TEMPERATURE: 98.1 F | OXYGEN SATURATION: 98 % | BODY MASS INDEX: 32.3 KG/M2 | WEIGHT: 201 LBS

## 2024-02-14 DIAGNOSIS — Z12.11 SCREEN FOR COLON CANCER: ICD-10-CM

## 2024-02-14 DIAGNOSIS — I10 PRIMARY HYPERTENSION: Primary | ICD-10-CM

## 2024-02-14 DIAGNOSIS — N18.31 STAGE 3A CHRONIC KIDNEY DISEASE (HCC): ICD-10-CM

## 2024-02-14 DIAGNOSIS — R73.09 ABNORMAL GLUCOSE: ICD-10-CM

## 2024-02-14 DIAGNOSIS — R73.03 PREDIABETES: ICD-10-CM

## 2024-02-14 DIAGNOSIS — C64.1 RENAL CANCER, RIGHT (HCC): ICD-10-CM

## 2024-02-14 DIAGNOSIS — E78.2 MIXED HYPERLIPIDEMIA: ICD-10-CM

## 2024-02-14 LAB
ANION GAP SERPL CALC-SCNC: 0 MMOL/L (ref 2–11)
BUN SERPL-MCNC: 23 MG/DL (ref 6–23)
CALCIUM SERPL-MCNC: 9.4 MG/DL (ref 8.3–10.4)
CHLORIDE SERPL-SCNC: 109 MMOL/L (ref 103–113)
CHOLEST SERPL-MCNC: 157 MG/DL
CO2 SERPL-SCNC: 29 MMOL/L (ref 21–32)
CREAT SERPL-MCNC: 1.5 MG/DL (ref 0.8–1.5)
GLUCOSE SERPL-MCNC: 112 MG/DL (ref 65–100)
HDLC SERPL-MCNC: 42 MG/DL (ref 40–60)
HDLC SERPL: 3.7
LDLC SERPL CALC-MCNC: 81.2 MG/DL
POTASSIUM SERPL-SCNC: 4.3 MMOL/L (ref 3.5–5.1)
SODIUM SERPL-SCNC: 138 MMOL/L (ref 136–146)
TRIGL SERPL-MCNC: 169 MG/DL (ref 35–150)
VLDLC SERPL CALC-MCNC: 33.8 MG/DL (ref 6–23)

## 2024-02-14 PROCEDURE — 99214 OFFICE O/P EST MOD 30 MIN: CPT | Performed by: FAMILY MEDICINE

## 2024-02-14 PROCEDURE — 3079F DIAST BP 80-89 MM HG: CPT | Performed by: FAMILY MEDICINE

## 2024-02-14 PROCEDURE — 3075F SYST BP GE 130 - 139MM HG: CPT | Performed by: FAMILY MEDICINE

## 2024-02-14 RX ORDER — OLMESARTAN MEDOXOMIL 40 MG/1
TABLET ORAL
Qty: 90 TABLET | Refills: 1 | Status: SHIPPED | OUTPATIENT
Start: 2024-02-14

## 2024-02-14 RX ORDER — ROSUVASTATIN CALCIUM 40 MG/1
40 TABLET, COATED ORAL EVERY EVENING
Qty: 90 TABLET | Refills: 1 | Status: SHIPPED | OUTPATIENT
Start: 2024-02-14

## 2024-02-14 NOTE — PROGRESS NOTES
HISTORY OF PRESENT ILLNESS  Chief Complaint   Patient presents with    Labs Only    Medication Refill     Walking a lot.  Can get a HA after viagra use.    Bp stays around the range of what it is today.    Subjective:   Raji Plasencia is a 53 y.o. male with hypertension.    Hypertension ROS: taking medications as instructed, no medication side effects noted, no TIA's, no chest pain on exertion, no dyspnea on exertion, no swelling of ankles.   Key CAD CHF Meds            rosuvastatin (CRESTOR) 40 MG tablet (Taking)    Sig - Route: Take 1 tablet by mouth every evening - Oral    olmesartan (BENICAR) 40 MG tablet (Taking)    Si po qhs for blood pressure instead of the ramipril           Lab Results   Component Value Date/Time     2023 08:36 AM    K 4.4 2023 08:36 AM     2023 08:36 AM    CO2 28 2023 08:36 AM    BUN 22 2023 08:36 AM    CREATININE 1.40 2023 08:36 AM    GLUCOSE 108 2023 08:36 AM    CALCIUM 9.1 2023 08:36 AM       Lab Results   Component Value Date    CREATININE 1.40 2023      Lab Results   Component Value Date    CHOL 167 2023    CHOL 200 (H) 2022    CHOL 186 2022     Lab Results   Component Value Date    TRIG 205 (H) 2023    TRIG 268 (H) 2022    TRIG 164 (H) 2022     Lab Results   Component Value Date    HDL 44 2023    HDL 35 (L) 2022    HDL 42 2022     Lab Results   Component Value Date    LDLCALC 82 2023    LDLCALC 111.4 (H) 2022    LDLCALC 111.2 (H) 2022     Lab Results   Component Value Date    VLDL 30 2021     Lab Results   Component Value Date    CHOLHDLRATIO 3.8 2023    CHOLHDLRATIO 5.7 2022    CHOLHDLRATIO 4.4 2022      No results found for: \"MALBCR\"   BP Readings from Last 3 Encounters:   24 136/88   23 132/88   22 125/83        Worse with stress, salt.  Improved with exercise, medication.  He is

## 2024-02-15 LAB
EST. AVERAGE GLUCOSE BLD GHB EST-MCNC: 114 MG/DL
HBA1C MFR BLD: 5.6 % (ref 4.8–5.6)

## 2024-02-28 ENCOUNTER — TELEPHONE (OUTPATIENT)
Dept: FAMILY MEDICINE CLINIC | Facility: CLINIC | Age: 54
End: 2024-02-28

## 2024-02-28 DIAGNOSIS — I10 PRIMARY HYPERTENSION: Primary | ICD-10-CM

## 2024-02-28 NOTE — TELEPHONE ENCOUNTER
Patients BP med was changed at his last visit and he is having Diarrhea when taking the new medication   please advise

## 2024-02-29 RX ORDER — VALSARTAN 160 MG/1
TABLET ORAL
Qty: 90 TABLET | Refills: 1 | Status: SHIPPED | OUTPATIENT
Start: 2024-02-29

## 2024-02-29 NOTE — TELEPHONE ENCOUNTER
Try changing it to another medication to see if that helps.  There has been a stomach virus going around as well.  You might need to give it a few weeks to let everything calm down.      Prescription(s) refilled  It (they) has been escribed to the pharmacy.    Requested Prescriptions     Signed Prescriptions Disp Refills    valsartan (DIOVAN) 160 MG tablet 90 tablet 1     Si po qd for blood pressure instead of the olmesartan     Authorizing Provider: GUERITA LONG     No orders of the defined types were placed in this encounter.          ICD-10-CM    1. Primary hypertension  I10 valsartan (DIOVAN) 160 MG tablet

## 2024-04-10 ENCOUNTER — PATIENT MESSAGE (OUTPATIENT)
Dept: FAMILY MEDICINE CLINIC | Facility: CLINIC | Age: 54
End: 2024-04-10

## 2024-04-10 DIAGNOSIS — M65.30 TRIGGER FINGER, UNSPECIFIED FINGER, UNSPECIFIED LATERALITY: Primary | ICD-10-CM

## 2024-04-10 RX ORDER — CIPROFLOXACIN 500 MG/1
500 TABLET, FILM COATED ORAL 2 TIMES DAILY
COMMUNITY
Start: 2024-03-25

## 2024-04-10 NOTE — TELEPHONE ENCOUNTER
Orders Placed This Encounter   Procedures    [facility] (Hand Surgery)     Referral Priority:   Routine     Referral Type:   Eval and Treat     Referral Reason:   Specialty Services Required     Requested Specialty:   Hand Surgery     Number of Visits Requested:   1

## 2024-04-10 NOTE — TELEPHONE ENCOUNTER
From: Raji Plasencia  To: Dr. Bernadette Bauer  Sent: 4/10/2024 11:36 AM EDT  Subject: Carpal tunnel in finger     Dr. Bauer. The carpal tunnel in my right middle finger has gotten a bit worse. My finger has started clamping up at night to the point that it wakes me up and I have to unclamp it with my other hand. It’s a bit painful. I have begun putting a “stint” on it to keep this from occurring. It aLos continues throughout the day to “catch” at the middle knuckle although I can undo it without the need of the other hand. Please advise.

## 2024-05-08 ENCOUNTER — TELEPHONE (OUTPATIENT)
Dept: FAMILY MEDICINE CLINIC | Facility: CLINIC | Age: 54
End: 2024-05-08

## 2024-05-08 NOTE — TELEPHONE ENCOUNTER
The 10-year ASCVD risk score (Concetta DILL, et al., 2019) is: 5.3%    Values used to calculate the score:      Age: 54 years      Sex: Male      Is Non- : No      Diabetic: No      Tobacco smoker: No      Systolic Blood Pressure: 136 mmHg      Is BP treated: Yes      HDL Cholesterol: 42 MG/DL      Total Cholesterol: 157 MG/DL

## 2024-05-09 ENCOUNTER — CLINICAL DOCUMENTATION (OUTPATIENT)
Dept: SURGERY | Age: 54
End: 2024-05-09

## 2024-05-09 NOTE — PROGRESS NOTES
Pt has no family history of colon cancer  Pt is not taking anticoagulation  Pt has no previous colonoscopies discoverable through chart review    I have reviewed the patient's chart and consider the patient an acceptable risk for screening colonoscopy without a formal office visit.  We will contact the patient to give the details of the bowel prep and to schedule screening colonoscopy in the near future.     Once the colonoscopy has been completed, the Health Maintenance will be updated accordingly.     DIANNE Fournier - NP

## 2024-05-13 ENCOUNTER — OFFICE VISIT (OUTPATIENT)
Age: 54
End: 2024-05-13
Payer: COMMERCIAL

## 2024-05-13 VITALS — HEIGHT: 66 IN | BODY MASS INDEX: 31.66 KG/M2 | WEIGHT: 197 LBS

## 2024-05-13 DIAGNOSIS — M65.339 TRIGGER MIDDLE FINGER, UNSPECIFIED LATERALITY: Primary | ICD-10-CM

## 2024-05-13 PROCEDURE — 20550 NJX 1 TENDON SHEATH/LIGAMENT: CPT | Performed by: ORTHOPAEDIC SURGERY

## 2024-05-13 PROCEDURE — 99203 OFFICE O/P NEW LOW 30 MIN: CPT | Performed by: ORTHOPAEDIC SURGERY

## 2024-05-13 RX ORDER — BETAMETHASONE SODIUM PHOSPHATE AND BETAMETHASONE ACETATE 3; 3 MG/ML; MG/ML
6 INJECTION, SUSPENSION INTRA-ARTICULAR; INTRALESIONAL; INTRAMUSCULAR; SOFT TISSUE ONCE
Status: COMPLETED | OUTPATIENT
Start: 2024-05-13 | End: 2024-05-13

## 2024-05-13 RX ADMIN — BETAMETHASONE SODIUM PHOSPHATE AND BETAMETHASONE ACETATE 6 MG: 3; 3 INJECTION, SUSPENSION INTRA-ARTICULAR; INTRALESIONAL; INTRAMUSCULAR; SOFT TISSUE at 08:48

## 2024-05-13 NOTE — PROGRESS NOTES
Orthopaedic Hand Surgery Note    Name: Raji Plasencia  YOB: 1970  Gender: male  MRN: [id number]    CC:  hand pain    HPI: Patient is a 54 y.o. male with a chief complaint of right middle clicking, locking and pain. The symptoms have been going on for 1 year. He is starting to notice some clicking in the left middle finger.     ROS/Meds/PSH/PMH/FH/SH: I personally reviewed the patients standard intake form.  Pertinents are discussed in the HPI    Physical Examination:  Musculoskeletal:   Examination on the bilateral demonstrates Normal sensation to light touch in the median distribution, normal sensation in ulnar and radial distribution, positive tenderness of the middle A1 pulley with palpable clicking and positive  locking only on the right, no clicking or locking on the left. The extensor tendons all track well over the MCP joints.    Imaging / Electrodiagnostic Tests:     none    Assessment:     ICD-10-CM    1. Trigger middle finger, unspecified laterality  M65.339           Plan:  We discussed the diagnosis and different treatment options. We discussed observation, splinting, cortisone injections and surgical release of the A1 pulley. We discussed that stenosing tenosynovitis at the level of the A1 pulley AKA trigger finger is a chronic condition regardless of how long the symptoms have been present, this most likely has been progressing for much longer than the symptoms were evident and it will likely persist for a long time without medical treatment. Furthermore, the many patients require surgical trigger finger release at some point despite some short-term benefits of conservative treatment.  After discussing in detail the patient elects to proceed with cortisone injection, NSAIDS.     Procedure Note    The risk, benefits and alternatives of injection and no injection therapy were discussed. Risks including skin blanching, subcutaneous fat atrophy, and elevations in blood glucose levels

## 2024-05-14 ENCOUNTER — PREP FOR PROCEDURE (OUTPATIENT)
Dept: SURGERY | Age: 54
End: 2024-05-14

## 2024-05-14 DIAGNOSIS — Z12.11 COLON CANCER SCREENING: ICD-10-CM

## 2024-06-10 RX ORDER — SODIUM CHLORIDE 0.9 % (FLUSH) 0.9 %
5-40 SYRINGE (ML) INJECTION EVERY 12 HOURS SCHEDULED
Status: CANCELLED | OUTPATIENT
Start: 2024-06-10

## 2024-06-10 RX ORDER — SODIUM CHLORIDE 9 MG/ML
INJECTION, SOLUTION INTRAVENOUS PRN
Status: CANCELLED | OUTPATIENT
Start: 2024-06-10

## 2024-06-10 RX ORDER — SODIUM CHLORIDE 0.9 % (FLUSH) 0.9 %
5-40 SYRINGE (ML) INJECTION PRN
Status: CANCELLED | OUTPATIENT
Start: 2024-06-10

## 2024-06-13 PROBLEM — Z12.11 COLON CANCER SCREENING: Status: RESOLVED | Noted: 2024-05-14 | Resolved: 2024-06-13

## 2024-07-09 NOTE — PRE-PROCEDURE INSTRUCTIONS
Patient verified name, , and procedure.    Type: 1a; abbreviated assessment per anesthesia guidelines    Labs per anesthesia: none    Instructed pt that they will be notified the day before their procedure by the GI Lab for time of arrival if their procedure is [place] and Pre-op for [place] cases. Arrival times should be called by 5 pm. If no phone is received the patient should contact their respective hospital. The GI lab telephone number is [phone] and ES Pre-op is [phone].     Follow diet and prep instructions per office including NPO status.      Bath or shower the night before and the am of surgery with non-moisturizing soap. No lotions, oils, powders, cologne on skin. No make up, eye make up or jewelry. Wear loose fitting comfortable, clean clothing.     Must have adult present in building the entire time .     Medications for the day of procedure none,   Patient to hold Valsartan am of procedure per anesthesia guidelines.

## 2024-07-10 NOTE — PROGRESS NOTES
RN called Pt to confirm appointment time of 1300, arrival time 1130, location,  requirement, and instructions for registration at the hospital.  Pt verbalized understanding.

## 2024-07-11 ENCOUNTER — ANESTHESIA EVENT (OUTPATIENT)
Dept: ENDOSCOPY | Age: 54
End: 2024-07-11
Payer: COMMERCIAL

## 2024-07-11 ENCOUNTER — ANESTHESIA (OUTPATIENT)
Dept: ENDOSCOPY | Age: 54
End: 2024-07-11
Payer: COMMERCIAL

## 2024-07-11 ENCOUNTER — HOSPITAL ENCOUNTER (OUTPATIENT)
Age: 54
Setting detail: OUTPATIENT SURGERY
Discharge: HOME | End: 2024-07-11
Attending: SURGERY | Admitting: SURGERY
Payer: COMMERCIAL

## 2024-07-11 VITALS
OXYGEN SATURATION: 97 % | BODY MASS INDEX: 31.82 KG/M2 | RESPIRATION RATE: 20 BRPM | HEIGHT: 66 IN | SYSTOLIC BLOOD PRESSURE: 133 MMHG | DIASTOLIC BLOOD PRESSURE: 62 MMHG | HEART RATE: 69 BPM | WEIGHT: 198 LBS | TEMPERATURE: 98 F

## 2024-07-11 PROCEDURE — 7100000010 HC PHASE II RECOVERY - FIRST 15 MIN: Performed by: SURGERY

## 2024-07-11 PROCEDURE — 3700000000 HC ANESTHESIA ATTENDED CARE: Performed by: SURGERY

## 2024-07-11 PROCEDURE — 6360000002 HC RX W HCPCS: Performed by: REGISTERED NURSE

## 2024-07-11 PROCEDURE — 2500000003 HC RX 250 WO HCPCS: Performed by: REGISTERED NURSE

## 2024-07-11 PROCEDURE — 2580000003 HC RX 258: Performed by: ANESTHESIOLOGY

## 2024-07-11 PROCEDURE — 7100000011 HC PHASE II RECOVERY - ADDTL 15 MIN: Performed by: SURGERY

## 2024-07-11 PROCEDURE — 45385 COLONOSCOPY W/LESION REMOVAL: CPT | Performed by: SURGERY

## 2024-07-11 PROCEDURE — 3700000001 HC ADD 15 MINUTES (ANESTHESIA): Performed by: SURGERY

## 2024-07-11 PROCEDURE — 3609010200 HC COLONOSCOPY ABLATION TUMOR POLYP/OTHER LES: Performed by: SURGERY

## 2024-07-11 PROCEDURE — 2709999900 HC NON-CHARGEABLE SUPPLY: Performed by: SURGERY

## 2024-07-11 PROCEDURE — 88305 TISSUE EXAM BY PATHOLOGIST: CPT

## 2024-07-11 RX ORDER — SODIUM CHLORIDE 0.9 % (FLUSH) 0.9 %
5-40 SYRINGE (ML) INJECTION PRN
Status: DISCONTINUED | OUTPATIENT
Start: 2024-07-11 | End: 2024-07-11 | Stop reason: HOSPADM

## 2024-07-11 RX ORDER — SODIUM CHLORIDE 0.9 % (FLUSH) 0.9 %
5-40 SYRINGE (ML) INJECTION EVERY 12 HOURS SCHEDULED
Status: DISCONTINUED | OUTPATIENT
Start: 2024-07-11 | End: 2024-07-11 | Stop reason: HOSPADM

## 2024-07-11 RX ORDER — LIDOCAINE HYDROCHLORIDE 20 MG/ML
INJECTION, SOLUTION EPIDURAL; INFILTRATION; INTRACAUDAL; PERINEURAL PRN
Status: DISCONTINUED | OUTPATIENT
Start: 2024-07-11 | End: 2024-07-11 | Stop reason: SDUPTHER

## 2024-07-11 RX ORDER — SODIUM CHLORIDE 9 MG/ML
INJECTION, SOLUTION INTRAVENOUS PRN
Status: DISCONTINUED | OUTPATIENT
Start: 2024-07-11 | End: 2024-07-11 | Stop reason: HOSPADM

## 2024-07-11 RX ORDER — PROPOFOL 10 MG/ML
INJECTION, EMULSION INTRAVENOUS PRN
Status: DISCONTINUED | OUTPATIENT
Start: 2024-07-11 | End: 2024-07-11 | Stop reason: SDUPTHER

## 2024-07-11 RX ORDER — SODIUM CHLORIDE, SODIUM LACTATE, POTASSIUM CHLORIDE, CALCIUM CHLORIDE 600; 310; 30; 20 MG/100ML; MG/100ML; MG/100ML; MG/100ML
INJECTION, SOLUTION INTRAVENOUS CONTINUOUS
Status: DISCONTINUED | OUTPATIENT
Start: 2024-07-11 | End: 2024-07-11 | Stop reason: HOSPADM

## 2024-07-11 RX ORDER — LIDOCAINE HYDROCHLORIDE 10 MG/ML
1 INJECTION, SOLUTION INFILTRATION; PERINEURAL
Status: DISCONTINUED | OUTPATIENT
Start: 2024-07-11 | End: 2024-07-11 | Stop reason: HOSPADM

## 2024-07-11 RX ORDER — NALOXONE HYDROCHLORIDE 0.4 MG/ML
INJECTION, SOLUTION INTRAMUSCULAR; INTRAVENOUS; SUBCUTANEOUS PRN
Status: DISCONTINUED | OUTPATIENT
Start: 2024-07-11 | End: 2024-07-11 | Stop reason: HOSPADM

## 2024-07-11 RX ORDER — ONDANSETRON 2 MG/ML
4 INJECTION INTRAMUSCULAR; INTRAVENOUS
Status: DISCONTINUED | OUTPATIENT
Start: 2024-07-11 | End: 2024-07-11 | Stop reason: HOSPADM

## 2024-07-11 RX ADMIN — LIDOCAINE HYDROCHLORIDE 50 MG: 20 INJECTION, SOLUTION EPIDURAL; INFILTRATION; INTRACAUDAL; PERINEURAL at 13:03

## 2024-07-11 RX ADMIN — PROPOFOL 150 MCG/KG/MIN: 10 INJECTION, EMULSION INTRAVENOUS at 13:04

## 2024-07-11 RX ADMIN — PROPOFOL 50 MG: 10 INJECTION, EMULSION INTRAVENOUS at 13:03

## 2024-07-11 RX ADMIN — SODIUM CHLORIDE, SODIUM LACTATE, POTASSIUM CHLORIDE, AND CALCIUM CHLORIDE: 600; 310; 30; 20 INJECTION, SOLUTION INTRAVENOUS at 12:59

## 2024-07-11 ASSESSMENT — PAIN - FUNCTIONAL ASSESSMENT: PAIN_FUNCTIONAL_ASSESSMENT: 0-10

## 2024-07-11 ASSESSMENT — PAIN SCALES - GENERAL
PAINLEVEL_OUTOF10: 0

## 2024-07-11 NOTE — BRIEF OP NOTE
Brief Postoperative Note      Patient: Raji Plasencia  YOB: 1970  MRN: [id number]    Date of Procedure: 7/11/2024    Pre-Op Diagnosis Codes:     * Colon cancer screening [Z12.11]    Post-Op Diagnosis:  Normal exam except for proximal sigmoid polyp       Procedure(s):  COLONOSCOPY POLYPECTOMY    Surgeon(s):  Titus Cardenas Jr., MD    Assistant:  * No surgical staff found *    Anesthesia: Monitor Anesthesia Care    Estimated Blood Loss (mL): Minimal    Complications: None    Specimens:   ID Type Source Tests Collected by Time Destination   A : sigmoid colon polyp Tissue Colon SURGICAL PATHOLOGY Tiuts Cardenas Jr., MD 7/11/2024 1326        Implants:  * No implants in log *      Drains: * No LDAs found *    Findings:  Infection Present At Time Of Surgery (PATOS) (choose all levels that have infection present):  No infection present  Other Findings: As above.  Recommend repeating colonoscopy in 5 years.    Electronically signed by TITUS CARDENAS JR, MD on 7/11/2024 at 1:35 PM

## 2024-07-11 NOTE — OP NOTE
Operative Note      Patient: Raji Plasencia  YOB: 1970  MRN: [id number]    Date of Procedure: 7/11/2024    Pre-Op Diagnosis Codes:     * Colon cancer screening [Z12.11]    Post-Op Diagnosis:  Normal exam except for a sigmoid polyp in the proximal sigmoid colon       Procedure(s):  COLONOSCOPY POLYPECTOMY    Surgeon(s):  Titus Cardenas Jr., MD    Assistant:   * No surgical staff found *    Anesthesia: Monitor Anesthesia Care    Estimated Blood Loss (mL): Minimal    Complications: None    Specimens:   ID Type Source Tests Collected by Time Destination   A : sigmoid colon polyp Tissue Colon SURGICAL PATHOLOGY Titus Cardenas Jr., MD 7/11/2024 1326        Implants:  * No implants in log *      Drains: * No LDAs found *    Findings:  Infection Present At Time Of Surgery (PATOS) (choose all levels that have infection present):  No infection present  Other Findings: As above.  Recommend repeating colonoscopy in 5 years.    Detailed Description of Procedure:   Dictated   Job#[id number]    Electronically signed by TITUS CARDENAS JR, MD on 7/11/2024 at 1:36 PM

## 2024-07-11 NOTE — PROCEDURES
[address]  [address]                             PROCEDURE NOTE      PATIENT NAME: ESTELLA PERKINS           : 1970  MED REC NO: [id number]                       ROOM: [facility]  ACCOUNT NO: [id number]                       ADMIT DATE: 2024  PROVIDER: Law Cardenas Jr, MD    DATE OF SERVICE:  2024    PREOPERATIVE DIAGNOSES:  Screening colonoscopy.    POSTOPERATIVE DIAGNOSES:  Normal exam except for a polyp in the proximal sigmoid colon.    PROCEDURES PERFORMED:  Colonoscopy with hot snare polypectomy.    SURGEON:  Law Cardenas Jr, MD    ASSISTANT:  None.    ANESTHESIA:  MAC.    ESTIMATED BLOOD LOSS:  Minimal.    SPECIMENS REMOVED:  Proximal sigmoid colon polyp.         COMPLICATIONS:  None.    IMPLANTS:  None.        DESCRIPTION IN DETAIL:  After the patient was brought in the room, time-out was carried out and all were in agreement.  He was rolled on his left side.  MAC anesthesia administered.  Scope gently inserted into the rectum and passed to the level of cecum without difficulty and under direct visualization.  Slow withdrawal was done.  Cecum normal.  Ascending colon normal.  Transverse and descending colon normal.  Sigmoid colon was normal except at the very proximal portion, there was a small pedunculated polyp.  Hot snare was used to completely remove the polyp with minimal-to-no bleeding.  Polyp was retrieved and this was sent to pathology.  Rectum normal.  Anal exam was normal.  As much air as possible was removed prior to removing the scope.  The patient tolerated the procedure well.  Recommend repeating colonoscopy in 5 years.    COUNTS:  Correct.        LAW CARDENAS JR, MD      TCM/AQS  D:  2024 13:39:02  T:  2024 19:23:08  JOB #:  [id number]

## 2024-07-11 NOTE — H&P
[place] [address]  [phone]     History and Physical/Surgical Consult   Raji Plasencia    Admit date: 2024    MRN: [id number]     : 1970     Age: 54 y.o.          2024 12:59 PM    Subjective/HPI:   This patient is a 54 y.o. here today for his first colonoscopy.  No family history of colon cancer.  No blood in his stool.  No change in his bowel habits.    Review of Systems  Pertinent items are noted in HPI.  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   Antibody/Test Marker For Result  CD10 Follicle center B lymphocytes  Positive  TFE3 Translocation renal cell carcinoma  Positive       Past Surgical History:   Procedure Laterality Date    KIDNEY REMOVAL Right     NOSE SURGERY Bilateral 2022    Radiofrequency Ablation to Nasal  [place]wall with Vivaer for Nasal Valve Collapse [place] [facility] [place]    SINUS ENDOSCOPY Bilateral 2022    SINUS ENDOSCOPY FUNCTIONAL SURGERY Septoplasty, Bilateral Submucous Resection Inferior Turbinates [place] [facility] [place]    VASECTOMY        Allergies   Allergen Reactions    Olmesartan Diarrhea      Social History     Tobacco Use    Smoking status: Never    Smokeless tobacco: Never   Substance Use Topics    Alcohol use: Yes     Comment: socially      Social History     Social History Narrative    Not on file     Family History   Problem Relation Age of Onset    Heart Disease Brother         Stent in LAD    Lung Disease Father         lung disease    High Cholesterol Mother     Heart Disease Mother     Liver Disease Mother     Diabetes Mother     Hypertension Mother       Prior to

## 2024-07-11 NOTE — ANESTHESIA POSTPROCEDURE EVALUATION
Department of Anesthesiology  Postprocedure Note    Patient: Raji Plasencia  MRN: [id number]  YOB: 1970  Date of evaluation: 7/11/2024    Procedure Summary       Date: 07/11/24 Room / Location: [facility] 04 / [facility] ENDOSCOPY    Anesthesia Start: 1259 Anesthesia Stop: 1334    Procedure: COLONOSCOPY POLYPECTOMY Diagnosis:       Colon cancer screening      (Colon cancer screening [Z12.11])    Surgeons: Law August Jr., MD Responsible Provider: GARRETT Lagunas MD    Anesthesia Type: TIVA ASA Status: 2            Anesthesia Type: No value filed.    Carl Phase I: Carl Score: 10    Carl Phase II: Carl Score: 10    Anesthesia Post Evaluation    Patient location during evaluation: PACU  Patient participation: complete - patient participated  Level of consciousness: awake and alert  Airway patency: patent  Nausea & Vomiting: no nausea and no vomiting  Cardiovascular status: hemodynamically stable  Respiratory status: acceptable  Hydration status: euvolemic  Comments: Blood pressure 133/62, pulse 69, temperature 98 °F (36.7 °C), temperature source Oral, resp. rate 20, height 1.676 m (5' 6\"), weight 89.8 kg (198 lb), SpO2 97 %.    No apparent anesthetic complications.  Pt stable for discharge from PACU  Multimodal analgesia pain management approach  Pain management: adequate    No notable events documented.

## 2024-07-11 NOTE — DISCHARGE INSTRUCTIONS
Gastrointestinal Colonoscopy/Flexible Sigmoidoscopy - Lower Exam Discharge Instructions  Call Dr. August for any problems or questions.  Contact the doctor’s office for follow up appointment as directed  Medication may cause drowsiness for several hours, therefore, do not drive or operate machinery for remainder of the day.  No alcohol today.  Do not make any important decisions such signing legal paperwork.  Ordinarily, you may resume regular diet and activity after exam unless otherwise specified by your physician.  Because of air put into your colon during exam, you may experience some abdominal distension, relieved by the passage of gas, for several hours.  Contact your physician if you have any of the following:  Excessive amount of bleeding - large amount when having a bowel movement.  Occasional specks of blood with bowel movement would not be unusual.  Severe abdominal pain  Fever or Chills  Polyp Removal - follow these additional instructions  Clear liquid diet for the next meal (jell-o, broth, clear drinks)  Soft diet for 24 hours, then resume regular diet   Take Metamucil - 1 tablespoon in juice every morning for 3 days, if needed.  No Aspirin, Advil, Aleve, Nuprin, Ibuprofen, or medications that contain these drugs for 2 weeks, unless taken for a heart condition.    Any additional instructions:   As instructed per Dr. August        Instructions given to Raji Plasencia and other family members.

## 2024-07-11 NOTE — ANESTHESIA PRE PROCEDURE
Department of Anesthesiology  Preprocedure Note       Name:  Raji Plasencia   Age:  54 y.o.  :  1970                                          MRN:  [id number]         Date:  2024      Surgeon: Surgeon(s):  Law August Jr., MD    Procedure: Procedure(s):  COLORECTAL CANCER SCREENING, NOT HIGH RISK    Medications prior to admission:   Prior to Admission medications    Medication Sig Start Date End Date Taking? Authorizing Provider   valsartan (DIOVAN) 160 MG tablet 1 po qd for blood pressure instead of the olmesartan 24   Bernadette Bauer MD   rosuvastatin (CRESTOR) 40 MG tablet Take 1 tablet by mouth every evening 24   Bernadette Bauer MD       Current medications:    Current Facility-Administered Medications   Medication Dose Route Frequency Provider Last Rate Last Admin   • naloxone 0.4 mg in 10 mL sodium chloride syringe   IntraVENous PRN GARRETT Lagunas MD       • sodium chloride flush 0.9 % injection 5-40 mL  5-40 mL IntraVENous 2 times per day GARRETT Lagunas MD       • sodium chloride flush 0.9 % injection 5-40 mL  5-40 mL IntraVENous PRN GARRETT Lagunas MD       • 0.9 % sodium chloride infusion   IntraVENous PRN GARRETT Lagunas MD       • ondansetron (ZOFRAN) injection 4 mg  4 mg IntraVENous Once PRN GARRETT Lagunas MD       • lidocaine 1 % injection 1 mL  1 mL IntraDERmal Once PRN GARRETT Lagunas MD       • lactated ringers IV soln infusion   IntraVENous Continuous GARRETT Lagunas MD       • sodium chloride flush 0.9 % injection 5-40 mL  5-40 mL IntraVENous 2 times per day GARRETT Lagunas MD       • sodium chloride flush 0.9 % injection 5-40 mL  5-40 mL IntraVENous PRN GARRETT Lagunas MD       • 0.9 % sodium chloride infusion   IntraVENous PRN GARRETT Lagunas MD       • sodium chloride flush 0.9 % injection 5-40 mL  5-40 mL IntraVENous 2 times per day Law August Jr., MD       • sodium chloride flush 0.9 % injection 5-40 mL  5-40 mL IntraVENous PRN

## 2024-07-16 ENCOUNTER — HOSPITAL ENCOUNTER (OUTPATIENT)
Dept: CT IMAGING | Age: 54
Discharge: HOME | End: 2024-07-19
Attending: UROLOGY
Payer: COMMERCIAL

## 2024-07-16 DIAGNOSIS — Z85.528 HISTORY OF RENAL CELL CANCER: ICD-10-CM

## 2024-07-16 PROBLEM — D12.6 TUBULAR ADENOMA OF COLON: Status: ACTIVE | Noted: 2024-07-11

## 2024-07-16 PROCEDURE — 74176 CT ABD & PELVIS W/O CONTRAST: CPT

## 2024-07-18 ENCOUNTER — OFFICE VISIT (OUTPATIENT)
Dept: UROLOGY | Age: 54
End: 2024-07-18
Payer: COMMERCIAL

## 2024-07-18 DIAGNOSIS — C64.1 RENAL CANCER, RIGHT (HCC): Primary | ICD-10-CM

## 2024-07-18 PROCEDURE — 99214 OFFICE O/P EST MOD 30 MIN: CPT | Performed by: UROLOGY

## 2024-07-18 NOTE — PROGRESS NOTES
[facility]  [address] [place] [address]  [phone]    Raji Plasencia  : 1970     HPI   54 y.o., male returns in follow up for RCC.  S/P R HALN on 19.  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 No new complaints.  He elected not to visit with Dr. Lira.  UMER on 23 was neg.  CT on 24 was also neg for recurrence.  Last Cr was 1.5 on 24.         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   Antibody/Test Marker For Result  CD10 Follicle center B lymphocytes  Positive  TFE3 Translocation renal cell carcinoma  Positive     Tubular adenoma of colon 2024    \"SIGMOID COLON POLYP\":  FRAGMENTS OF TUBULAR ADENOMA.     Past Surgical History:   Procedure Laterality Date    COLONOSCOPY N/A 2024    COLONOSCOPY POLYPECTOMY performed by Law August Jr., MD at [facility]OSCOPY    KIDNEY REMOVAL Right     NOSE SURGERY Bilateral 2022    Radiofrequency Ablation to Nasal  Sidewall with Vivaer for Nasal Valve Collapse [place] [facility] [place]    SINUS ENDOSCOPY Bilateral 2022    SINUS ENDOSCOPY FUNCTIONAL SURGERY Septoplasty, Bilateral Submucous Resection Inferior Turbinates [place] [facility] [place] [place]    VASECTOMY       Current Outpatient Medications   Medication Sig Dispense Refill    valsartan (DIOVAN) 160 MG tablet 1 po qd for blood pressure instead of the olmesartan 90 tablet 1    rosuvastatin (CRESTOR) 40 MG tablet Take 1 tablet by mouth every evening 90 tablet 1     No current facility-administered medications for this visit.     Allergies   Allergen Reactions    Olmesartan Diarrhea     Social History     Socioeconomic

## 2024-08-01 ENCOUNTER — OFFICE VISIT (OUTPATIENT)
Age: 54
End: 2024-08-01
Payer: COMMERCIAL

## 2024-08-01 DIAGNOSIS — M65.339 TRIGGER MIDDLE FINGER, UNSPECIFIED LATERALITY: Primary | ICD-10-CM

## 2024-08-01 PROCEDURE — 99214 OFFICE O/P EST MOD 30 MIN: CPT | Performed by: ORTHOPAEDIC SURGERY

## 2024-08-01 NOTE — PROGRESS NOTES
Orthopaedic Hand Surgery Note    Name: Raji Plasencia  YOB: 1970  Gender: male  MRN: [id number]    CC: Follow up for trigger finger    HPI: Patient is a 54 y.o. male who returns today for reevaluation of a right middle trigger finger. Last visit we provided cortisone injection.    ROS/Meds/PSH/PMH/FH/SH: I personally reviewed the patients standard intake form.  Pertinents are discussed in the HPI    Physical Examination:  Musculoskeletal:   Examination on the right  upper extremity demonstrates, normal sensation and good cap refill in all fingers, positive tenderness over the middle A1 pulley with palpable clicking and positive  locking.    Imaging / Electrodiagnostic Tests:     none    Assessment:     ICD-10-CM    1. Trigger middle finger, unspecified laterality  M65.339           Plan:  We discussed the diagnosis and different treatment options. We discussed observation, splinting, cortisone injections and surgical release of the A1 pulley. We discussed that stenosing tenosynovitis at the level of the A1 pulley AKA trigger finger is a chronic condition regardless of how long the symptoms have been present, this most likely has been progressing for much longer than the symptoms were evident and it will likely persist for a long time without medical treatment. Furthermore, many patients require surgical release at some point despite some short-term benefits of conservative treatment.  After discussing in detail the patient elects to proceed with surgical treatment. He will contact us to set up a date.    Patient understands risks and benefits of ultrasound guided right middle trigger finger release including but not limited to nerve injury, vessel injury, infection, failure to achieve desired results and possible need for additional surgery. Patient understands and wishes to proceed with surgery.     On Exam:   The patient is alert and oriented  Cardiovascular: regular rate and

## 2024-08-02 NOTE — PROCEDURE: LIQUID NITROGEN
[FreeTextEntry1] : Bilateral GH DJD. Xrays and advanced imaging reviewed. Previously discussed TSA, RSA.  B/L GH injections by Dr. Chase 4/20/23. Discussed timing of injections.  Ibuprofen 600mg prn, refilled. Bilateral shoulder orthovisc completed 1/24/24, mild relief. B/L GH injections given today. RTO prn, consider repeating orthovisc.  Procedure Note: Large Joint Injection was performed because of pain and inflammation, failure of conservative treatment.   Medications: Depo-Medrol: 1 cc, 80 mg. Lidocaine: 2 cc, 1%.  Marcaine: 2 cc, .25%.   Medication was injected in the left glenohumeral joint. Patient has tried OTC's including aspirin, Ibuprofen, Aleve etc or prescription NSAIDs, and/or exercises at home and/ or physical therapy without satisfactory response. The risks, benefits, and alternatives to cortisone injection were explained in full to the patient. Risks outlined include but are not limited to infection, sepsis, bleeding, scarring, skin discoloration, temporary increase in pain, syncopal episode, failure to resolve symptoms, allergic reaction, symptom recurrence, and elevation of blood sugar in diabetics. Patient understood the risks. All questions were answered. After discussion of options, patient requested an injection. Oral informed consent was obtained and sterile prep of the injection site was performed using alcohol. Sterile technique was utilized for the procedure including the preparation of the solutions used for the injection. Ethyl chloride spray was used topically.  Sterile technique used. Patient tolerated procedure well. Post Procedure Instructions: Patient was advised to call if redness, pain, or fever occur and apply ice for 15 min. out of every hour for the next 12-24 hours as tolerated. patient was advised to rest the joint(s) for 2 days.  Ultrasound Guidance was used for the following reasons: for Glenohumeral injection.  Ultrasound guided injection was performed of the shoulder, visualization of the needle and placement of injection was performed without complication.  Procedure Note: Large Joint Injection was performed because of pain and inflammation, failure of conservative treatment.   Medications: Depo-Medrol: 1 cc, 80 mg. Lidocaine: 2 cc, 1%.  Marcaine: 2 cc, .25%.   Medication was injected in the right glenohumeral joint. Patient has tried OTC's including aspirin, Ibuprofen, Aleve etc or prescription NSAIDs, and/or exercises at home and/ or physical therapy without satisfactory response. The risks, benefits, and alternatives to cortisone injection were explained in full to the patient. Risks outlined include but are not limited to infection, sepsis, bleeding, scarring, skin discoloration, temporary increase in pain, syncopal episode, failure to resolve symptoms, allergic reaction, symptom recurrence, and elevation of blood sugar in diabetics. Patient understood the risks. All questions were answered. After discussion of options, patient requested an injection. Oral informed consent was obtained and sterile prep of the injection site was performed using alcohol. Sterile technique was utilized for the procedure including the preparation of the solutions used for the injection. Ethyl chloride spray was used topically.  Sterile technique used. Patient tolerated procedure well. Post Procedure Instructions: Patient was advised to call if redness, pain, or fever occur and apply ice for 15 min. out of every hour for the next 12-24 hours as tolerated. patient was advised to rest the joint(s) for 2 days.  Ultrasound Guidance was used for the following reasons: for Glenohumeral injection.  Ultrasound guided injection was performed of the shoulder, visualization of the needle and placement of injection was performed without complication.  
Render Post-Care Instructions In Note?: no
Medical Necessity Clause: This procedure was medically necessary because the lesions that were treated were:irritated
Post-Care Instructions: I reviewed with the patient in detail post-care instructions. Patient is to wear sunprotection, and avoid picking at any of the treated lesions. Pt may apply Vaseline to crusted or scabbing areas.
Consent: The patient's consent was obtained including but not limited to risks of crusting, scabbing, blistering, scarring, darker or lighter pigmentary change, recurrence, incomplete removal and infection.
Medical Necessity Information: It is in your best interest to select a reason for this procedure from the list below. All of these items fulfill various CMS LCD requirements except the new and changing color options.
Detail Level: Detailed

## 2024-09-12 ASSESSMENT — ENCOUNTER SYMPTOMS
SHORTNESS OF BREATH: 0
ORTHOPNEA: 0
BLURRED VISION: 0

## 2024-09-13 ENCOUNTER — OFFICE VISIT (OUTPATIENT)
Dept: FAMILY MEDICINE CLINIC | Facility: CLINIC | Age: 54
End: 2024-09-13
Payer: COMMERCIAL

## 2024-09-13 VITALS
BODY MASS INDEX: 34.13 KG/M2 | RESPIRATION RATE: 16 BRPM | HEIGHT: 66 IN | DIASTOLIC BLOOD PRESSURE: 88 MMHG | OXYGEN SATURATION: 96 % | TEMPERATURE: 97.3 F | HEART RATE: 68 BPM | WEIGHT: 212.4 LBS | SYSTOLIC BLOOD PRESSURE: 134 MMHG

## 2024-09-13 DIAGNOSIS — E78.2 MIXED HYPERLIPIDEMIA: ICD-10-CM

## 2024-09-13 DIAGNOSIS — H69.90 DYSFUNCTION OF EUSTACHIAN TUBE, UNSPECIFIED LATERALITY: ICD-10-CM

## 2024-09-13 DIAGNOSIS — I10 PRIMARY HYPERTENSION: Primary | ICD-10-CM

## 2024-09-13 PROBLEM — Z85.828 HISTORY OF MALIGNANT NEOPLASM OF SKIN: Status: ACTIVE | Noted: 2024-09-13

## 2024-09-13 PROCEDURE — 3075F SYST BP GE 130 - 139MM HG: CPT | Performed by: FAMILY MEDICINE

## 2024-09-13 PROCEDURE — 3079F DIAST BP 80-89 MM HG: CPT | Performed by: FAMILY MEDICINE

## 2024-09-13 PROCEDURE — 99214 OFFICE O/P EST MOD 30 MIN: CPT | Performed by: FAMILY MEDICINE

## 2024-09-13 RX ORDER — ROSUVASTATIN CALCIUM 40 MG/1
40 TABLET, COATED ORAL EVERY EVENING
Qty: 90 TABLET | Refills: 1 | Status: SHIPPED | OUTPATIENT
Start: 2024-09-13

## 2024-09-13 RX ORDER — VALSARTAN 160 MG/1
TABLET ORAL
Qty: 90 TABLET | Refills: 1 | Status: SHIPPED | OUTPATIENT
Start: 2024-09-13

## 2024-09-13 SDOH — ECONOMIC STABILITY: FOOD INSECURITY: WITHIN THE PAST 12 MONTHS, THE FOOD YOU BOUGHT JUST DIDN'T LAST AND YOU DIDN'T HAVE MONEY TO GET MORE.: NEVER TRUE

## 2024-09-13 SDOH — ECONOMIC STABILITY: INCOME INSECURITY: HOW HARD IS IT FOR YOU TO PAY FOR THE VERY BASICS LIKE FOOD, HOUSING, MEDICAL CARE, AND HEATING?: NOT VERY HARD

## 2024-09-13 SDOH — ECONOMIC STABILITY: FOOD INSECURITY: WITHIN THE PAST 12 MONTHS, YOU WORRIED THAT YOUR FOOD WOULD RUN OUT BEFORE YOU GOT MONEY TO BUY MORE.: NEVER TRUE

## 2024-09-13 ASSESSMENT — PATIENT HEALTH QUESTIONNAIRE - PHQ9
1. LITTLE INTEREST OR PLEASURE IN DOING THINGS: NOT AT ALL
2. FEELING DOWN, DEPRESSED OR HOPELESS: NOT AT ALL
SUM OF ALL RESPONSES TO PHQ QUESTIONS 1-9: 0
SUM OF ALL RESPONSES TO PHQ9 QUESTIONS 1 & 2: 0
SUM OF ALL RESPONSES TO PHQ QUESTIONS 1-9: 0

## 2024-09-13 ASSESSMENT — ENCOUNTER SYMPTOMS
CONSTIPATION: 0
ABDOMINAL PAIN: 0
BACK PAIN: 0
DIARRHEA: 0
NAUSEA: 0
SINUS PAIN: 0
COLOR CHANGE: 0
SINUS PRESSURE: 0
VOMITING: 0

## 2024-09-25 ENCOUNTER — TELEPHONE (OUTPATIENT)
Dept: ORTHOPEDIC SURGERY | Age: 54
End: 2024-09-25

## 2024-10-07 ENCOUNTER — TELEPHONE (OUTPATIENT)
Dept: ORTHOPEDIC SURGERY | Age: 54
End: 2024-10-07

## 2024-10-08 ENCOUNTER — TELEPHONE (OUTPATIENT)
Dept: ORTHOPEDIC SURGERY | Age: 54
End: 2024-10-08

## 2024-10-08 NOTE — TELEPHONE ENCOUNTER
I called and spoke with patient.  Got his surgery scheduled for 12/4/2024.  Advised him we would call him closer to his surgery date with additional information.  Patient voiced understanding.

## 2024-10-30 DIAGNOSIS — M65.339 TRIGGER MIDDLE FINGER, UNSPECIFIED LATERALITY: Primary | ICD-10-CM

## 2024-10-30 DIAGNOSIS — M65.331 TRIGGER MIDDLE FINGER OF RIGHT HAND: ICD-10-CM

## 2024-11-06 ENCOUNTER — TELEPHONE (OUTPATIENT)
Age: 54
End: 2024-11-06

## 2024-11-06 NOTE — TELEPHONE ENCOUNTER
I left voice message for Mr. Plasencia with the sx and post-op info.  I also left the day/time for his pre-op appt with Dr. Clark before sx.

## 2024-11-08 ENCOUNTER — TELEPHONE (OUTPATIENT)
Dept: ORTHOPEDIC SURGERY | Age: 54
End: 2024-11-08

## 2024-11-21 ENCOUNTER — OFFICE VISIT (OUTPATIENT)
Age: 54
End: 2024-11-21

## 2024-11-21 DIAGNOSIS — M65.339 TRIGGER MIDDLE FINGER, UNSPECIFIED LATERALITY: Primary | ICD-10-CM

## 2024-11-22 NOTE — H&P (VIEW-ONLY)
Orthopaedic Hand Surgery Note    Name: Raji Plasencia  YOB: 1970  Gender: male  MRN: [id number]    CC: Follow up for trigger finger    HPI: Patient is a 54 y.o. male who returns today for reevaluation of a right middle trigger finger. He is now ready to pursue surgery    ROS/Meds/PSH/PMH/FH/SH: I personally reviewed the patients standard intake form.  Pertinents are discussed in the HPI    Physical Examination:  Musculoskeletal:   Examination on the right  upper extremity demonstrates, normal sensation and good cap refill in all fingers, positive tenderness over the middle A1 pulley with palpable clicking and positive  locking.    Imaging / Electrodiagnostic Tests:     none    Assessment:     ICD-10-CM    1. Trigger middle finger, unspecified laterality  M65.339           Plan:  We discussed the diagnosis and different treatment options. We discussed observation, splinting, cortisone injections and surgical release of the A1 pulley. We discussed that stenosing tenosynovitis at the level of the A1 pulley AKA trigger finger is a chronic condition regardless of how long the symptoms have been present, this most likely has been progressing for much longer than the symptoms were evident and it will likely persist for a long time without medical treatment. Furthermore, many patients require surgical release at some point despite some short-term benefits of conservative treatment.  After discussing in detail the patient elects to proceed with surgical treatment. He will contact us to set up a date.    Patient understands risks and benefits of ultrasound guided right middle trigger finger release including but not limited to nerve injury, vessel injury, infection, failure to achieve desired results and possible need for additional surgery. Patient understands and wishes to proceed with surgery.     On Exam:   The patient is alert and oriented  Cardiovascular: regular rate and rhythm  Respiratory: Non

## 2024-11-22 NOTE — PROGRESS NOTES
Orthopaedic Hand Surgery Note    Name: Raji Plasencia  YOB: 1970  Gender: male  MRN: [id number]    CC: Follow up for trigger finger    HPI: Patient is a 54 y.o. male who returns today for reevaluation of a right middle trigger finger. He is now ready to pursue surgery    ROS/Meds/PSH/PMH/FH/SH: I personally reviewed the patients standard intake form.  Pertinents are discussed in the HPI    Physical Examination:  Musculoskeletal:   Examination on the right  upper extremity demonstrates, normal sensation and good cap refill in all fingers, positive tenderness over the middle A1 pulley with palpable clicking and positive  locking.    Imaging / Electrodiagnostic Tests:     none    Assessment:     ICD-10-CM    1. Trigger middle finger, unspecified laterality  M65.339           Plan:  We discussed the diagnosis and different treatment options. We discussed observation, splinting, cortisone injections and surgical release of the A1 pulley. We discussed that stenosing tenosynovitis at the level of the A1 pulley AKA trigger finger is a chronic condition regardless of how long the symptoms have been present, this most likely has been progressing for much longer than the symptoms were evident and it will likely persist for a long time without medical treatment. Furthermore, many patients require surgical release at some point despite some short-term benefits of conservative treatment.  After discussing in detail the patient elects to proceed with surgical treatment. He will contact us to set up a date.    Patient understands risks and benefits of ultrasound guided right middle trigger finger release including but not limited to nerve injury, vessel injury, infection, failure to achieve desired results and possible need for additional surgery. Patient understands and wishes to proceed with surgery.     On Exam:   The patient is alert and oriented  Cardiovascular: regular rate and rhythm  Respiratory: Non

## 2024-11-25 NOTE — PERIOP NOTE
Patient verified name, , and procedure.    Abbreviated Assessment completed.     Procedure posted local; no anesthesia involved.     Instructed pt that they will be notified of their arrival time the day before their procedure; call [facility] Pre-op @ [phone] if you don't receive a call by 2 pm.     Follow office instructions including NPO status and medications for DOS.      Bath or shower the night before and the am of surgery with non-moisturizing soap. Wear loose fitting comfortable, clean clothing.

## 2024-12-03 NOTE — PERIOP NOTE
Preop department called to notify patient of arrival time for scheduled procedure. Instructions given to   - Arrive at [facility] Entrance [address]  - No solid food after midnight & Please drink 32 ounces of water 2 hours prior to your arrival to avoid dehydration unless otherwise indicated. No gum, mints, or ice chips.   - Have a responsible adult to drive patient to the hospital, stay during surgery, and patient will need supervision 24 hours after anesthesia.   - Use antibacterial soap in shower the night before surgery and on the morning of surgery.       Was patient contacted: no  Voicemail left: yes  Numbers contacted: [phone]   Arrival time: 0530  Time to complete 32 ounces of water: 0330

## 2024-12-04 ENCOUNTER — HOSPITAL ENCOUNTER (OUTPATIENT)
Age: 54
Setting detail: OUTPATIENT SURGERY
Discharge: HOME | End: 2024-12-04
Attending: ORTHOPAEDIC SURGERY | Admitting: ORTHOPAEDIC SURGERY
Payer: COMMERCIAL

## 2024-12-04 VITALS
TEMPERATURE: 98.1 F | OXYGEN SATURATION: 96 % | HEART RATE: 54 BPM | HEIGHT: 66 IN | SYSTOLIC BLOOD PRESSURE: 148 MMHG | WEIGHT: 205 LBS | DIASTOLIC BLOOD PRESSURE: 72 MMHG | BODY MASS INDEX: 32.95 KG/M2 | RESPIRATION RATE: 16 BRPM

## 2024-12-04 DIAGNOSIS — M65.331 TRIGGER MIDDLE FINGER OF RIGHT HAND: Primary | ICD-10-CM

## 2024-12-04 PROCEDURE — 2720000010 HC SURG SUPPLY STERILE: Performed by: ORTHOPAEDIC SURGERY

## 2024-12-04 PROCEDURE — 7100000010 HC PHASE II RECOVERY - FIRST 15 MIN: Performed by: ORTHOPAEDIC SURGERY

## 2024-12-04 PROCEDURE — 6370000000 HC RX 637 (ALT 250 FOR IP): Performed by: ANESTHESIOLOGY

## 2024-12-04 PROCEDURE — 2709999900 HC NON-CHARGEABLE SUPPLY: Performed by: ORTHOPAEDIC SURGERY

## 2024-12-04 PROCEDURE — 6360000002 HC RX W HCPCS: Performed by: ORTHOPAEDIC SURGERY

## 2024-12-04 PROCEDURE — 3600000002 HC SURGERY LEVEL 2 BASE: Performed by: ORTHOPAEDIC SURGERY

## 2024-12-04 RX ORDER — PROCHLORPERAZINE EDISYLATE 5 MG/ML
5 INJECTION INTRAMUSCULAR; INTRAVENOUS
Status: DISCONTINUED | OUTPATIENT
Start: 2024-12-04 | End: 2024-12-04 | Stop reason: HOSPADM

## 2024-12-04 RX ORDER — IBUPROFEN 600 MG/1
1 TABLET ORAL PRN
Status: DISCONTINUED | OUTPATIENT
Start: 2024-12-04 | End: 2024-12-04 | Stop reason: HOSPADM

## 2024-12-04 RX ORDER — SODIUM CHLORIDE 0.9 % (FLUSH) 0.9 %
5-40 SYRINGE (ML) INJECTION EVERY 12 HOURS SCHEDULED
Status: DISCONTINUED | OUTPATIENT
Start: 2024-12-04 | End: 2024-12-04 | Stop reason: HOSPADM

## 2024-12-04 RX ORDER — OXYCODONE HYDROCHLORIDE 5 MG/1
5 TABLET ORAL
Status: DISCONTINUED | OUTPATIENT
Start: 2024-12-04 | End: 2024-12-04 | Stop reason: HOSPADM

## 2024-12-04 RX ORDER — LIDOCAINE HYDROCHLORIDE 10 MG/ML
INJECTION, SOLUTION INFILTRATION; PERINEURAL PRN
Status: DISCONTINUED | OUTPATIENT
Start: 2024-12-04 | End: 2024-12-04 | Stop reason: ALTCHOICE

## 2024-12-04 RX ORDER — SODIUM CHLORIDE 0.9 % (FLUSH) 0.9 %
5-40 SYRINGE (ML) INJECTION PRN
Status: DISCONTINUED | OUTPATIENT
Start: 2024-12-04 | End: 2024-12-04 | Stop reason: HOSPADM

## 2024-12-04 RX ORDER — NALOXONE HYDROCHLORIDE 0.4 MG/ML
INJECTION, SOLUTION INTRAMUSCULAR; INTRAVENOUS; SUBCUTANEOUS PRN
Status: DISCONTINUED | OUTPATIENT
Start: 2024-12-04 | End: 2024-12-04 | Stop reason: HOSPADM

## 2024-12-04 RX ORDER — SODIUM CHLORIDE, SODIUM LACTATE, POTASSIUM CHLORIDE, CALCIUM CHLORIDE 600; 310; 30; 20 MG/100ML; MG/100ML; MG/100ML; MG/100ML
INJECTION, SOLUTION INTRAVENOUS CONTINUOUS
Status: DISCONTINUED | OUTPATIENT
Start: 2024-12-04 | End: 2024-12-04 | Stop reason: HOSPADM

## 2024-12-04 RX ORDER — ACETAMINOPHEN AND CODEINE PHOSPHATE 300; 30 MG/1; MG/1
1 TABLET ORAL EVERY 4 HOURS PRN
Qty: 12 TABLET | Refills: 0 | Status: SHIPPED | OUTPATIENT
Start: 2024-12-04 | End: 2024-12-07

## 2024-12-04 RX ORDER — DIPHENHYDRAMINE HYDROCHLORIDE 50 MG/ML
12.5 INJECTION INTRAMUSCULAR; INTRAVENOUS
Status: DISCONTINUED | OUTPATIENT
Start: 2024-12-04 | End: 2024-12-04 | Stop reason: HOSPADM

## 2024-12-04 RX ORDER — ACETAMINOPHEN 500 MG
1000 TABLET ORAL ONCE
Status: COMPLETED | OUTPATIENT
Start: 2024-12-04 | End: 2024-12-04

## 2024-12-04 RX ORDER — DEXTROSE MONOHYDRATE 100 MG/ML
INJECTION, SOLUTION INTRAVENOUS CONTINUOUS PRN
Status: DISCONTINUED | OUTPATIENT
Start: 2024-12-04 | End: 2024-12-04 | Stop reason: HOSPADM

## 2024-12-04 RX ORDER — MIDAZOLAM HYDROCHLORIDE 2 MG/2ML
2 INJECTION, SOLUTION INTRAMUSCULAR; INTRAVENOUS
Status: DISCONTINUED | OUTPATIENT
Start: 2024-12-04 | End: 2024-12-04 | Stop reason: HOSPADM

## 2024-12-04 RX ORDER — LIDOCAINE HYDROCHLORIDE 10 MG/ML
1 INJECTION, SOLUTION INFILTRATION; PERINEURAL
Status: DISCONTINUED | OUTPATIENT
Start: 2024-12-04 | End: 2024-12-04 | Stop reason: HOSPADM

## 2024-12-04 RX ORDER — SODIUM CHLORIDE 9 MG/ML
INJECTION, SOLUTION INTRAVENOUS PRN
Status: DISCONTINUED | OUTPATIENT
Start: 2024-12-04 | End: 2024-12-04 | Stop reason: HOSPADM

## 2024-12-04 RX ORDER — HYDROMORPHONE HYDROCHLORIDE 2 MG/ML
0.5 INJECTION, SOLUTION INTRAMUSCULAR; INTRAVENOUS; SUBCUTANEOUS EVERY 10 MIN PRN
Status: DISCONTINUED | OUTPATIENT
Start: 2024-12-04 | End: 2024-12-04 | Stop reason: HOSPADM

## 2024-12-04 RX ORDER — FENTANYL CITRATE 50 UG/ML
100 INJECTION, SOLUTION INTRAMUSCULAR; INTRAVENOUS
Status: DISCONTINUED | OUTPATIENT
Start: 2024-12-04 | End: 2024-12-04 | Stop reason: HOSPADM

## 2024-12-04 RX ADMIN — ACETAMINOPHEN 1000 MG: 500 TABLET, FILM COATED ORAL at 06:10

## 2024-12-04 ASSESSMENT — PAIN SCALES - GENERAL
PAINLEVEL_OUTOF10: 0

## 2024-12-04 ASSESSMENT — PAIN - FUNCTIONAL ASSESSMENT: PAIN_FUNCTIONAL_ASSESSMENT: 0-10

## 2024-12-04 NOTE — OP NOTE
Hand Surgery Operative Note      Raji Plasencia   54 y.o.   male   12/4/2024     Pre-op diagnosis: Right middle Trigger Finger   Post op diagnosis: same      Procedure: Right  middle Trigger Finger Release with Ultrasound Guidance     Surgeon: Sara Clark MD     Anesthesia: Local + MAC     Tourniquet time: * No tourniquets in log *      Procedure indications: Patient with catching and locking of the above mentioned finger(s) which have been recalcitrant to nonoperative measures. After Thorough discussion, the patient decided to proceed with surgical management. We discussed in detail surgical risks including scar, pain, bleeding, infection, anesthetic risks, neurovascular injury, need for further surgery,  weakness, stiffness, risk of death and potential risk of other unforseen complication.      Procedure description:      The hand and fingers were prepared and draped in the usual sterile fashion. Using a sterile ultrasound cover and sterile gel, relevant anatomical landmarks were identified, including but not limited to the A1 pulley, finger flexor tendons and sheath, adjacent digital neurovascular bundles, and region of the A2 pulley. A sterile ink marker was used to jaret the region of the A1 pulley of the middle finger as well as the incision site in the region of the palm overlying the metacarpal shaft.     The UltraGuideTFR™ was inspected per the 's instructions.      A local anesthetic was administered. A 25-gauge needle was used to create a small skin wheel at the incision site using 1-2 ml of a mixture 1% lidocaine plain and 0.25% Bupivacaine plain. Following this, using ultrasound guidance, local anesthesia was delivered into and superficial to the flexor tendon sheath, as well as the region of the A1 pulley.      A #15 scalpel blade was used to make a small transverse stab incision in the region of the palmar flexion crease. The UltraGuideTFR introducer was then passed through the

## 2024-12-09 ENCOUNTER — TELEPHONE (OUTPATIENT)
Dept: ORTHOPEDIC SURGERY | Age: 54
End: 2024-12-09

## 2024-12-09 NOTE — TELEPHONE ENCOUNTER
Patient would like to rescheduled his Post Op appt for 12/16. Pt says he goes back to work that day.

## 2024-12-09 NOTE — TELEPHONE ENCOUNTER
I called and spoke with patient, got him rescheduled for 12/17/2024 at 9:00 a.m. with Dr. Clark.  Patient voiced understanding.

## 2024-12-17 ENCOUNTER — OFFICE VISIT (OUTPATIENT)
Age: 54
End: 2024-12-17

## 2024-12-17 DIAGNOSIS — M65.339 TRIGGER MIDDLE FINGER, UNSPECIFIED LATERALITY: Primary | ICD-10-CM

## 2024-12-17 PROCEDURE — 99024 POSTOP FOLLOW-UP VISIT: CPT | Performed by: ORTHOPAEDIC SURGERY

## 2024-12-17 NOTE — PROGRESS NOTES
Orthopaedic Hand Surgery Note    Name: Raji Plasencia  YOB: 1970  Gender: male  MRN: [id number]    HPI: Patient is status post Right middle finger ultrasound guided trigger finger release - Right on 12/4/2024. Patient reports pain is improved, no finger locking. He complains that the finger is stiff     Physical Examination:  Wound healing well. Good finger and wrist range of motion. Middle finger composite flexion is mildly limited. Pain is improved from preoperative.    Assessment:     ICD-10-CM    1. Trigger middle finger, unspecified laterality  M65.339           Status post Right middle finger ultrasound guided trigger finger release - Right on 12/4/2024    Plan:  We discussed the treatment and therapy plan.  Patient was instructed on ROM exercises and will monitor progress - if motion is limited over the next 7-10 days we will progress into formal hand therapy.  Patient will return to clinic as needed.    Sara Clark MD  Orthopaedic Surgery  12/17/24  5:35 PM

## 2025-01-12 ENCOUNTER — PATIENT MESSAGE (OUTPATIENT)
Dept: FAMILY MEDICINE CLINIC | Facility: CLINIC | Age: 55
End: 2025-01-12

## 2025-03-06 RX ORDER — VALSARTAN 160 MG/1
160 TABLET ORAL NIGHTLY
Qty: 90 TABLET | Refills: 2 | Status: CANCELLED | OUTPATIENT
Start: 2025-03-06

## 2025-03-07 ENCOUNTER — OFFICE VISIT (OUTPATIENT)
Dept: FAMILY MEDICINE CLINIC | Facility: CLINIC | Age: 55
End: 2025-03-07
Payer: COMMERCIAL

## 2025-03-07 VITALS
HEART RATE: 64 BPM | TEMPERATURE: 97.9 F | RESPIRATION RATE: 16 BRPM | OXYGEN SATURATION: 97 % | HEIGHT: 66 IN | SYSTOLIC BLOOD PRESSURE: 147 MMHG | DIASTOLIC BLOOD PRESSURE: 90 MMHG | WEIGHT: 214.8 LBS | BODY MASS INDEX: 34.52 KG/M2

## 2025-03-07 DIAGNOSIS — I10 PRIMARY HYPERTENSION: ICD-10-CM

## 2025-03-07 DIAGNOSIS — R73.09 ABNORMAL GLUCOSE: ICD-10-CM

## 2025-03-07 DIAGNOSIS — C64.1 RENAL CANCER, RIGHT (HCC): ICD-10-CM

## 2025-03-07 DIAGNOSIS — N18.31 STAGE 3A CHRONIC KIDNEY DISEASE (HCC): ICD-10-CM

## 2025-03-07 DIAGNOSIS — D12.6 TUBULAR ADENOMA OF COLON: ICD-10-CM

## 2025-03-07 DIAGNOSIS — E78.2 MIXED HYPERLIPIDEMIA: ICD-10-CM

## 2025-03-07 DIAGNOSIS — Z90.5 H/O RIGHT NEPHRECTOMY: Primary | ICD-10-CM

## 2025-03-07 LAB
ALBUMIN SERPL-MCNC: 4.2 G/DL (ref 3.5–5)
ALBUMIN/GLOB SERPL: 1.4 (ref 1–1.9)
ALP SERPL-CCNC: 64 U/L (ref 40–129)
ALT SERPL-CCNC: 34 U/L (ref 8–55)
ANION GAP SERPL CALC-SCNC: 9 MMOL/L (ref 7–16)
AST SERPL-CCNC: 31 U/L (ref 15–37)
BACTERIA URNS QL MICRO: NEGATIVE /HPF
BILIRUB SERPL-MCNC: 0.8 MG/DL (ref 0–1.2)
BUN SERPL-MCNC: 23 MG/DL (ref 6–23)
CALCIUM SERPL-MCNC: 9.7 MG/DL (ref 8.8–10.2)
CHLORIDE SERPL-SCNC: 105 MMOL/L (ref 98–107)
CHOLEST SERPL-MCNC: 165 MG/DL (ref 0–200)
CO2 SERPL-SCNC: 26 MMOL/L (ref 20–29)
CREAT SERPL-MCNC: 1.5 MG/DL (ref 0.8–1.3)
CREAT UR-MCNC: 275 MG/DL (ref 39–259)
EPI CELLS #/AREA URNS HPF: NORMAL /HPF (ref 0–5)
EST. AVERAGE GLUCOSE BLD GHB EST-MCNC: 123 MG/DL
GLOBULIN SER CALC-MCNC: 3 G/DL (ref 2.3–3.5)
GLUCOSE SERPL-MCNC: 101 MG/DL (ref 70–99)
HBA1C MFR BLD: 5.9 % (ref 0–5.6)
HDLC SERPL-MCNC: 40 MG/DL (ref 40–60)
HDLC SERPL: 4.1 (ref 0–5)
HYALINE CASTS URNS QL MICRO: NORMAL /LPF
LDLC SERPL CALC-MCNC: 92 MG/DL (ref 0–100)
MICROALBUMIN UR-MCNC: <1.2 MG/DL (ref 0–20)
MICROALBUMIN/CREAT UR-RTO: ABNORMAL MG/G (ref 0–30)
POTASSIUM SERPL-SCNC: 4.5 MMOL/L (ref 3.5–5.1)
PROT SERPL-MCNC: 7.2 G/DL (ref 6.3–8.2)
PSA SERPL-MCNC: 0.4 NG/ML (ref 0–4)
RBC #/AREA URNS HPF: NORMAL /HPF (ref 0–5)
SODIUM SERPL-SCNC: 140 MMOL/L (ref 136–145)
TRIGL SERPL-MCNC: 165 MG/DL (ref 0–150)
VLDLC SERPL CALC-MCNC: 33 MG/DL (ref 6–23)
WBC URNS QL MICRO: NORMAL /HPF (ref 0–4)

## 2025-03-07 PROCEDURE — 99214 OFFICE O/P EST MOD 30 MIN: CPT | Performed by: FAMILY MEDICINE

## 2025-03-07 PROCEDURE — 3080F DIAST BP >= 90 MM HG: CPT | Performed by: FAMILY MEDICINE

## 2025-03-07 PROCEDURE — 3077F SYST BP >= 140 MM HG: CPT | Performed by: FAMILY MEDICINE

## 2025-03-07 RX ORDER — ROSUVASTATIN CALCIUM 40 MG/1
40 TABLET, COATED ORAL EVERY EVENING
Qty: 90 TABLET | Refills: 2 | Status: SHIPPED | OUTPATIENT
Start: 2025-03-07

## 2025-03-07 RX ORDER — VALSARTAN AND HYDROCHLOROTHIAZIDE 160; 12.5 MG/1; MG/1
1 TABLET, FILM COATED ORAL DAILY
Qty: 90 TABLET | Refills: 1 | Status: SHIPPED | OUTPATIENT
Start: 2025-03-07

## 2025-03-07 SDOH — ECONOMIC STABILITY: FOOD INSECURITY: WITHIN THE PAST 12 MONTHS, YOU WORRIED THAT YOUR FOOD WOULD RUN OUT BEFORE YOU GOT MONEY TO BUY MORE.: NEVER TRUE

## 2025-03-07 SDOH — ECONOMIC STABILITY: FOOD INSECURITY: WITHIN THE PAST 12 MONTHS, THE FOOD YOU BOUGHT JUST DIDN'T LAST AND YOU DIDN'T HAVE MONEY TO GET MORE.: NEVER TRUE

## 2025-03-07 ASSESSMENT — PATIENT HEALTH QUESTIONNAIRE - PHQ9
2. FEELING DOWN, DEPRESSED OR HOPELESS: NOT AT ALL
1. LITTLE INTEREST OR PLEASURE IN DOING THINGS: NOT AT ALL
SUM OF ALL RESPONSES TO PHQ QUESTIONS 1-9: 0

## 2025-03-07 NOTE — PROGRESS NOTES
HISTORY OF PRESENT ILLNESS  Chief Complaint   Patient presents with    Hypertension    Weight Management     NOTICE FOR THE PATIENT: This clinical note is not designed to be interpreted by patients.  We do not recommend reading it unless you have medical training. These notes may contain candid and (unintentionally) offensive descriptions, which are sometimes required for accurate documentation. If you would like more information about your healthcare, please obtain it directly by myself or my staff/colleagues - never solely from the notes. Thank you for your understanding and cooperation.       History of Present Illness  The patient presents for evaluation of hypertension, weight management, and sleep issues.    He reports a satisfactory control of his blood pressure, although it is slightly elevated than his usual range. He has been under the care of Dr. Madrigal, with annual visits, during which his PSA levels were not routinely monitored.    He maintains an active lifestyle, engaging in cardiovascular exercises for approximately 40 minutes to an hour, which includes running. He expresses concern about the appropriateness of this exercise regimen given his age. Despite his efforts, he has observed a gradual weight gain, which he attributes to muscle mass increase due to his exercise routine. He also acknowledges that his diet is not optimal.    He plans to resume trazodone therapy, having previously discontinued it due to the induction of vivid dreams that disrupted his sleep. He has been using Benadryl Sleep tablets as an alternative, which allows him approximately 6 hours of sleep, but without any dream recall.    MEDICATIONS  Current: Benadryl sleep tablets  Past: trazodone    Previously said, \"   7/18/2024 - [facility]  1. Renal cancer, right (HCC)  C64.1    Pt without recurrence at nearly 5y out from [facility].  Pros/cons of continued surveillance reviewed.  He opted to stop surveillance.

## 2025-03-08 ENCOUNTER — PATIENT MESSAGE (OUTPATIENT)
Dept: FAMILY MEDICINE CLINIC | Facility: CLINIC | Age: 55
End: 2025-03-08

## 2025-03-11 ENCOUNTER — RESULTS FOLLOW-UP (OUTPATIENT)
Dept: FAMILY MEDICINE CLINIC | Facility: CLINIC | Age: 55
End: 2025-03-11

## 2025-03-12 NOTE — RESULT ENCOUNTER NOTE
Your kidney function is borderline.  We will need to keep an eye on that.  Make sure you are drinking plenty of fluids.  Stay away from anti-inflammatories like Advil and Aleve.  Your A1c, the 3 month blood sugar test, is in the prediabetic range. That means your average blood sugar is elevated.  That puts you at higher risk for heart disease.   Your triglycerides are elevated.  Triglycerides are the sugar part of your cholesterol.  Make sure you are eating a heart healthy nutritious diet, getting regular aerobic physical activity, maintaining desired appropriate body weight, and avoiding tobacco products.    Keep working on your diet and exercise.  Continue your current medications.  Other labs are normal.

## 2025-04-10 ENCOUNTER — PATIENT MESSAGE (OUTPATIENT)
Dept: FAMILY MEDICINE CLINIC | Facility: CLINIC | Age: 55
End: 2025-04-10

## 2025-04-10 DIAGNOSIS — F51.01 PRIMARY INSOMNIA: Primary | ICD-10-CM

## 2025-04-10 DIAGNOSIS — N52.9 ERECTILE DYSFUNCTION, UNSPECIFIED ERECTILE DYSFUNCTION TYPE: ICD-10-CM

## 2025-04-10 RX ORDER — TRAZODONE HYDROCHLORIDE 50 MG/1
50 TABLET ORAL NIGHTLY PRN
Qty: 90 TABLET | Refills: 1 | Status: SHIPPED | OUTPATIENT
Start: 2025-04-10

## 2025-04-10 RX ORDER — SILDENAFIL 100 MG/1
TABLET, FILM COATED ORAL
Qty: 30 TABLET | Refills: 3 | Status: SHIPPED | OUTPATIENT
Start: 2025-04-10

## 2025-04-10 NOTE — TELEPHONE ENCOUNTER
I called and spoke with patient.  Got his pre op with  Friend moved to 11/21/2024 at 10:00 a.m.  Patient voiced understanding.     Continue Regimen: Clindamycin solution and minocycline. Detail Level: Zone Render In Strict Bullet Format?: No

## 2025-04-10 NOTE — TELEPHONE ENCOUNTER
Prescription (s) refilled  It (they) has been escribed to the pharmacy.    Requested Prescriptions     Signed Prescriptions Disp Refills    traZODone (DESYREL) 50 MG tablet 90 tablet 1     Sig: Take 1 tablet by mouth nightly as needed for Sleep     Authorizing Provider: GUERITA LONG    sildenafil (VIAGRA) 100 MG tablet 30 tablet 3     Si/2 - 1 po 30-60 min prior to activity     Authorizing Provider: GUERITA LONG     No orders of the defined types were placed in this encounter.          ICD-10-CM    1. Primary insomnia  F51.01 traZODone (DESYREL) 50 MG tablet      2. Erectile dysfunction, unspecified erectile dysfunction type  N52.9 sildenafil (VIAGRA) 100 MG tablet

## 2025-08-07 ENCOUNTER — PATIENT MESSAGE (OUTPATIENT)
Dept: FAMILY MEDICINE CLINIC | Facility: CLINIC | Age: 55
End: 2025-08-07

## (undated) DEVICE — TROCAR: Brand: KII FIOS FIRST ENTRY

## (undated) DEVICE — AIRLIFE™ OXYGEN TUBING 7 FEET (2.1 M) CRUSH RESISTANT OXYGEN TUBING, VINYL TIPPED: Brand: AIRLIFE™

## (undated) DEVICE — CLEAR IMAGE SINGLES, HIGH VISCOSITY: Brand: CLEAR IMAGE SINGLES

## (undated) DEVICE — AGENT HEMSTAT W2XL14IN OXIDIZED REGENERATED CELOS ABSRB FOR

## (undated) DEVICE — CANISTER, RIGID, 2000CC: Brand: MEDLINE INDUSTRIES, INC.

## (undated) DEVICE — YANKAUER,BULB TIP,W/O VENT,RIGID,STERILE: Brand: MEDLINE

## (undated) DEVICE — SINGLE PORT MANIFOLD: Brand: NEPTUNE 2

## (undated) DEVICE — DRESSING,GAUZE,XEROFORM,CURAD,1"X8",ST: Brand: CURAD

## (undated) DEVICE — DRAPE,U/SHT,SPLIT,FILM,60X84,STERILE: Brand: MEDLINE

## (undated) DEVICE — [HIGH FLOW INSUFFLATOR,  DO NOT USE IF PACKAGE IS DAMAGED,  KEEP DRY,  KEEP AWAY FROM SUNLIGHT,  PROTECT FROM HEAT AND RADIOACTIVE SOURCES.]: Brand: PNEUMOSURE

## (undated) DEVICE — DRAPE,LAP,CHOLE,W/TROUGHS,STERILE: Brand: MEDLINE

## (undated) DEVICE — TOWEL SURG W17XL27IN STD BLU COT NONFENESTRATED PREWASHED

## (undated) DEVICE — BNDG,ELSTC,MATRIX,STRL,2"X5YD,LF,HOOK&LP: Brand: MEDLINE

## (undated) DEVICE — STYLUS NSL BREATHING VIVAER ARC

## (undated) DEVICE — GLOVE ORANGE PI 7 1/2   MSG9075

## (undated) DEVICE — SHEARS ENDOSCP L36CM DIA5MM ULTRASONIC CRV TIP W/ ADV

## (undated) DEVICE — GLOVE SURG SZ 65 THK91MIL LTX FREE SYN POLYISOPRENE

## (undated) DEVICE — GLOVE SURG SZ 65 L12IN FNGR THK79MIL GRN LTX FREE

## (undated) DEVICE — KIT PROCEDURE SURG HEAD AND NECK TOTE

## (undated) DEVICE — BLADE SURG NO15 S STL STR DISP GLASSVAN

## (undated) DEVICE — ACCESS PLATFORM FOR MINIMALLY INVASIVE SURGERY.: Brand: GELPORT® LAPAROSCOPIC  SYSTEM

## (undated) DEVICE — SUTURE CHROMIC GUT SZ 3-0 L27IN ABSRB BRN L19MM FS-2 3/8 636H

## (undated) DEVICE — TROCAR: Brand: KII® OPTICAL ACCESS SYSTEM

## (undated) DEVICE — TROCAR: Brand: KII SLEEVE

## (undated) DEVICE — SOLUTION IRRIG 1000ML 0.9% SOD CHL USP POUR PLAS BTL

## (undated) DEVICE — 3M™ IOBAN™ 2 ANTIMICROBIAL INCISE DRAPE 6650EZ: Brand: IOBAN™ 2

## (undated) DEVICE — HAND PACK: Brand: MEDLINE INDUSTRIES, INC.

## (undated) DEVICE — SYRINGE, LUER SLIP, STERILE, 60ML: Brand: MEDLINE

## (undated) DEVICE — LAPAROSCOPIC TROCAR SLEEVE/SINGLE USE: Brand: KII® SLEEVE

## (undated) DEVICE — TRAP SPEC POLYP REM STRNR CLN DSGN MAGNIFYING WIND DISP

## (undated) DEVICE — GEL US 20GM NONIRRITATING OVERWRAPPED FILE PCH TRNSMIT

## (undated) DEVICE — GENERAL LAPAROSCOPY: Brand: MEDLINE INDUSTRIES, INC.

## (undated) DEVICE — ELECTRODE PT RET AD L9FT HI MOIST COND ADH HYDRGEL CORDED

## (undated) DEVICE — CONTAINER,SPECIMEN,O.R.STRL,4.5OZ: Brand: MEDLINE

## (undated) DEVICE — BLADE 1882040 5PK INFERIOR TURB 2MM

## (undated) DEVICE — SNARE VASC L240CM LOOP W10MM SHTH DIA2.4MM RND STIFF CLD

## (undated) DEVICE — POUCH SPEC RETRV SHFT 15MM 13X23CM GRN POLYUR DBL RNG HNDL

## (undated) DEVICE — KENDALL RADIOLUCENT FOAM MONITORING ELECTRODE RECTANGULAR SHAPE: Brand: KENDALL

## (undated) DEVICE — RELOAD STPL L45MM H1-2.6MM MESENTERY THN TISS WHT GRIPPING

## (undated) DEVICE — 2, DISPOSABLE SUCTION/IRRIGATOR WITHOUT DISPOSABLE TIP: Brand: STRYKEFLOW

## (undated) DEVICE — INTENDED FOR TISSUE SEPARATION, AND OTHER PROCEDURES THAT REQUIRE A SHARP SURGICAL BLADE TO PUNCTURE OR CUT.: Brand: BARD-PARKER SAFETY BLADES SIZE 15, STERILE

## (undated) DEVICE — SUTURE PDS II SZ 1 L27IN ABSRB VLT CT-1 L36MM 1/2 CIR Z341H

## (undated) DEVICE — SOLUTION IRRIG 3000ML 0.9% SOD CHL FLX CONT 0797208] ICU MEDICAL INC]

## (undated) DEVICE — STAPLER INT L340MM 45MM STD 12 FIRING B FRM PWR + GRIPPING

## (undated) DEVICE — KIT,ANTI FOG,W/SPONGE & FLUID,SOFT PACK: Brand: MEDLINE

## (undated) DEVICE — SYR 10ML LUER LOK 1/5ML GRAD --

## (undated) DEVICE — PADDING CAST W3INXL4YD COT BLEND MIC PLEAT UNDERCAST SPEC

## (undated) DEVICE — ENDOSCOPIC KIT 1.1+ OP4 CA DE 2 GWN AAMI LEVEL 3

## (undated) DEVICE — CARDINAL HEALTH FLEXIBLE LIGHT HANDLE COVER: Brand: CARDINAL HEALTH

## (undated) DEVICE — TUBING 1895522 5PK STRAIGHTSHOT TO XPS: Brand: STRAIGHTSHOT®

## (undated) DEVICE — BLADE 1884004HR TRICUT 5PK M4 4MM ROTATE: Brand: TRICUT

## (undated) DEVICE — CANNULA NSL ORAL AD FOR CAPNOFLEX CO2 O2 AIRLFE

## (undated) DEVICE — GARMENT,MEDLINE,DVT,INT,CALF,MED, GEN2: Brand: MEDLINE

## (undated) DEVICE — DEVICE ULTRAGUIDE TFR HANDHELD SINGLE USE

## (undated) DEVICE — GAUZE,SPONGE,4"X4",12PLY,WOVEN,NS,LF: Brand: MEDLINE

## (undated) DEVICE — AGENT HEMSTAT 8ML FLX TIP MTRX + DISP SURGIFLO

## (undated) DEVICE — TOTAL TRAY, DB, 100% SILI FOLEY, 16FR 10: Brand: MEDLINE

## (undated) DEVICE — (D)PREP SKN CHLRAPRP APPL 26ML -- CONVERT TO ITEM 371833

## (undated) DEVICE — SPLINT NSL SEPTAL SUPP REG PRE PUNCHED HOLE SIL STRL BRTH EZ

## (undated) DEVICE — BLADELESS TROCAR WITH FIXATION CANNULA: Brand: VERSAPORT PLUS

## (undated) DEVICE — LOGICUT SCISSOR LENGTH 320MM: Brand: LOGI - LAPAROSCOPIC INSTRUMENT SYSTEM

## (undated) DEVICE — SYRINGE MED 10ML LUERLOCK TIP W/O SFTY DISP

## (undated) DEVICE — CONNECTOR TBNG OD5-7MM O2 END DISP

## (undated) DEVICE — 48" PROBE COVER W/GEL, ULTRASOUND, STERILE: Brand: SITE-RITE

## (undated) DEVICE — SPONGE LAP 18X18IN STRL -- 5/PK

## (undated) DEVICE — Z INACTIVE USE 2735373 APPLICATOR FBR LAIN COT WOOD TIP ECONOMICAL

## (undated) DEVICE — SYRINGE MEDICAL 3ML CLEAR PLASTIC STANDARD NON CONTROL LUERLOCK TIP DISPOSABLE

## (undated) DEVICE — 2000CC GUARDIAN II: Brand: GUARDIAN

## (undated) DEVICE — APPLIER CLP M L L11.4IN DIA10MM ENDOSCP ROT MULT FOR LIG

## (undated) DEVICE — PENCIL ES L3M BTTN SWCH S STL HEX LOK BLDE ELECTRD HOLSTER

## (undated) DEVICE — Device

## (undated) DEVICE — TUBING, SUCTION, 1/4" X 10', STRAIGHT: Brand: MEDLINE

## (undated) DEVICE — CODMAN® SURGICAL PATTIES 1" X 3" (2.54CM X 7.62CM): Brand: CODMAN®

## (undated) DEVICE — SOLUTION IV 1000ML LAC RINGERS PH 6.5 INJ USP VIAFLX PLAS

## (undated) DEVICE — STRIP,CLOSURE,WOUND,MEDI-STRIP,1/2X4: Brand: MEDLINE

## (undated) DEVICE — REM POLYHESIVE ADULT PATIENT RETURN ELECTRODE: Brand: VALLEYLAB

## (undated) DEVICE — SUTURE VCRL SZ 0 L36IN ABSRB UD L36MM CT-1 1/2 CIR J946H

## (undated) DEVICE — SUTURE ETHLN SZ 3-0 L18IN NONABSORBABLE BLK FS-1 L24MM 3/8 663H

## (undated) DEVICE — CONTAINER FORMALIN PREFILLED 10% NBF 60ML

## (undated) DEVICE — SYR LR LCK 1ML GRAD NSAF 30ML --

## (undated) DEVICE — SHEATH 1912000 5PK 4MM/0DEG STORZ XOMED: Brand: ENDO-SCRUB®